# Patient Record
Sex: MALE | Race: WHITE | NOT HISPANIC OR LATINO | URBAN - METROPOLITAN AREA
[De-identification: names, ages, dates, MRNs, and addresses within clinical notes are randomized per-mention and may not be internally consistent; named-entity substitution may affect disease eponyms.]

---

## 2018-05-22 ENCOUNTER — OFFICE VISIT CONVERTED (OUTPATIENT)
Dept: FAMILY MEDICINE CLINIC | Facility: CLINIC | Age: 23
End: 2018-05-22
Attending: NURSE PRACTITIONER

## 2021-05-16 VITALS
DIASTOLIC BLOOD PRESSURE: 78 MMHG | TEMPERATURE: 97.7 F | HEART RATE: 96 BPM | HEIGHT: 70 IN | RESPIRATION RATE: 16 BRPM | BODY MASS INDEX: 34.36 KG/M2 | OXYGEN SATURATION: 99 % | SYSTOLIC BLOOD PRESSURE: 150 MMHG | WEIGHT: 240 LBS

## 2023-08-14 ENCOUNTER — OFFICE VISIT (OUTPATIENT)
Dept: ORTHOPEDIC SURGERY | Facility: CLINIC | Age: 28
End: 2023-08-14
Payer: COMMERCIAL

## 2023-08-14 VITALS
HEIGHT: 70 IN | WEIGHT: 243 LBS | SYSTOLIC BLOOD PRESSURE: 159 MMHG | HEART RATE: 111 BPM | DIASTOLIC BLOOD PRESSURE: 89 MMHG | BODY MASS INDEX: 34.79 KG/M2 | OXYGEN SATURATION: 96 %

## 2023-08-14 DIAGNOSIS — S82.892A CLOSED AVULSION FRACTURE OF LEFT ANKLE, INITIAL ENCOUNTER: Primary | ICD-10-CM

## 2023-08-14 DIAGNOSIS — S93.402A SPRAIN OF LEFT ANKLE, UNSPECIFIED LIGAMENT, INITIAL ENCOUNTER: ICD-10-CM

## 2023-08-14 NOTE — PROGRESS NOTES
"Chief Complaint  Follow-up of the Left Ankle    Subjective          Sergo Dominique presents to [facility]   History of Present Illness    Sergo Dominique presents today for a follow-up of his left ankle.  Patient has a left distal fibula avulsion fracture that we are treating conservatively with initial injury 7/10/2023.  Today, patient states that he is doing okay.  He has been ambulating in his boot without complications.  He reports continued stiffness and pain.  He states that his swelling has improved but continues.  He denies any new injuries.  Denies lower extremity numbness or tingling.      No Known Allergies     Social History     Socioeconomic History    Marital status:    Tobacco Use    Smoking status: Never    Smokeless tobacco: Current     Types: Chew   Vaping Use    Vaping Use: Never used   Substance and Sexual Activity    Alcohol use: Yes    Drug use: Never    Sexual activity: Yes     Partners: Female        I reviewed the patient's chief complaint, history of present illness, review of systems, past medical history, surgical history, family history, social history, medications, and allergy list.     REVIEW OF SYSTEMS    Constitutional: Denies fevers, chills, weight loss  Cardiovascular: Denies chest pain, shortness of breath  Skin: Denies rashes, acute skin changes  Neurologic: Denies headache, loss of consciousness  MSK: Left ankle pain      Objective   Vital Signs:   /89   Pulse 111   Ht 177.8 cm (70\")   Wt 110 kg (243 lb)   SpO2 96%   BMI 34.87 kg/mý     Body mass index is 34.87 kg/mý.    Physical Exam    General: Alert. No acute distress.   Left lower extremity: Tenderness to palpation over the distal fibula.  Nontender to the medial malleolus.  Calf soft, nontender.  Achilles intact.  Ankle stable ligamentous stress.  Demonstrates active ankle plantarflexion and dorsiflexion with associated stiffness.  Mild ankle swelling.  Toe range of " motion intact with associated stiffness.  Sensation intact over dorsal and plantar foot.  Palpable pedal pulses.    Procedures    Imaging Results (Most Recent)       Procedure Component Value Units Date/Time    XR Ankle 3+ View Left [[id number]] Resulted: 08/14/23 1530     Updated: 08/14/23 1534    Narrative:      Indications: Follow-up left ankle fracture    Views: AP, oblique, lateral left ankle    Findings: Left distal fibula avulsion fracture is seen.  Fracture is   stable.  Talus is well reduced beneath the tibia.    Comparative Data: Comparative data found and reviewed today.                   Assessment and Plan    Diagnoses and all orders for this visit:    1. Closed avulsion fracture of left ankle, initial encounter (Primary)  -     Ambulatory Referral to Physical Therapy POST OP, Evaluate and treat, Ortho    2. Sprain of left ankle, unspecified ligament, initial encounter  -     XR Ankle 3+ View Left  -     Ambulatory Referral to Physical Therapy POST OP, Evaluate and treat, Ortho        Sergo Dominique presents today for follow-up of his left ankle avulsion fracture and ankle sprain that we are treating conservatively with initial injury 7/10/2023.  X-rays reviewed with the patient and spouse today.  Formal physical therapy was ordered today.  Patient instructed to continue with his home exercises.  Continue to weight-bear as tolerated wearing the fracture boot until he progresses with formal physical therapy and then he can transition to an ankle brace as tolerated.  Ankle brace brace provided today.  We discussed swelling management with ice and elevation as needed.  Work note written today.      Patient will follow up in 3 weeks for reevaluation.  We will obtain new x-rays of the left ankle at next visit.      Call or return if symptoms worsen or patient has any concerns.       Follow Up   Return in about 3 weeks (around 9/4/2023).  Patient was given instructions and counseling regarding his condition  or for health maintenance advice. Please see specific information pulled into the AVS if appropriate.     Niya Walton PA-C  08/14/23  15:42 EDT

## 2023-08-29 ENCOUNTER — TREATMENT (OUTPATIENT)
Dept: PHYSICAL THERAPY | Facility: CLINIC | Age: 28
End: 2023-08-29
Payer: COMMERCIAL

## 2023-08-29 DIAGNOSIS — R26.9 GAIT DISTURBANCE: ICD-10-CM

## 2023-08-29 DIAGNOSIS — M25.672 ANKLE STIFFNESS, LEFT: ICD-10-CM

## 2023-08-29 DIAGNOSIS — M62.81 MUSCLE WEAKNESS OF LOWER EXTREMITY: ICD-10-CM

## 2023-08-29 DIAGNOSIS — M25.572 ACUTE LEFT ANKLE PAIN: Primary | ICD-10-CM

## 2023-08-29 NOTE — PROGRESS NOTES
Physical Therapy Initial Evaluation and Plan of Care      [facility]: [address] [place] [address]      Patient: Sergo Dominique   : 1995  Diagnosis/ICD-10 Code:  Acute left ankle pain [M25.572]  Referring practitioner: Niya Walton PA-C  Date of Initial Visit: 2023  Today's Date: 2023  Patient seen for 1 sessions           Subjective Questionnaire: LEFS: 37/80      Subjective   Pt reports to physical therapy w/ complaints of L ankle pain secondary to an avulsion fracture. This fracture occurred on 7/10/2023.   Pt reports they are still having some swelling in their L ankle and foot. Pt reports when they are out in public or when they are getting up. Pt reports were given an ankle brace, though this is uncomfortable and they do not like to wear it.   Pt reports their current level of pain is a 2-3/10. Pt reports their pain does increase throughout the day when they are active. Pt reports this pain gets up to a 4-5/10. Pt reports there is always some level of pain present.       Pt occupation: [employer] (pt has not been back to work since their fracture)    Quality of pain: annoying     Past Medical Hx: past high ankle sprain to L ankle in high school.       Xray 2023  Findings: Left distal fibula avulsion fracture is seen. Fracture is stable. Talus is well reduced beneath the tibia.       Objective          Active Range of Motion   Left Ankle/Foot   Plantar flexion: 45 degrees   Inversion: 18 degrees   Eversion: 5 degrees     Right Ankle/Foot   Dorsiflexion (ke): 5 degrees   Dorsiflexion (kf): 7 degrees   Plantar flexion: 55 degrees   Inversion: 35 degrees   Eversion: 8 degrees     Additional Active Range of Motion Details  Pt unable to achieve neutral w/ DF (ke and kf) on L: lacking 10 deg     Strength/Myotome Testing     Left Hip   Planes of Motion   Flexion: 5    Right Hip   Planes of Motion   Flexion: 5    Left Knee   Flexion: 5  Extension: 5    Right Knee    Flexion: 5  Extension: 5    Right Ankle/Foot   Dorsiflexion: 5  Inversion: 5  Eversion: 5    Additional Strength Details  L ankle not tested today due to limited ROM and pain     Swelling   Left Ankle/Foot   Figure 8: 60 cm    Right Ankle/Foot   Figure 8: 57.5 cm    Ambulation     Comments   Pt ambulates into therapy w/ walking boot on their L LE. Discussed w/ pt about bringing their brace to therapy at their next appointment.     See Exercise, Manual, and Modality Logs for complete treatment.       Assessment & Plan       Assessment  Impairments: abnormal gait, abnormal muscle firing, abnormal or restricted ROM, activity intolerance, impaired balance, impaired physical strength, lacks appropriate home exercise program and pain with function   Functional limitations: carrying objects, lifting, sleeping, walking, pushing, uncomfortable because of pain, moving in bed, sitting, standing, stooping and unable to perform repetitive tasks   Assessment details: Pt reports to physical therapy s/p avulsion fracture of their L ankle 7/10/2023. Pt is currently WBAT in boot and is to progress to wearing an ankle brace. Pt presents w/ decreased ROM, increased pain, and weakness. Pt has gait abnormalities due to pain and wearing a walking boot on their L foot. Pt has increased perceived deficits described by LEFS. Educated pt on their HEP as well as the importance of utilizing ice for inflammation and swelling. Pt will benefit from skilled physical therapy, to address their current impairments and limitations, in order to improve upon their functional mobility and gait to return to full duty at work safely and without restrictions.       Prognosis: good    Goals  Plan Goals: ANKLE/FOOT PROBLEMS:     1. The patient has limited ROM of the L ankle.   LTG 1: 12 weeks:  The patient will demonstrate 15 degrees of DF with knee extended for the L ankle in order to allow patient to ambulate w/ improved gait mechanics and decreased  pain.    STATUS:  New   STG 1a: 6 weeks:  The patient will demonstrate 8 degrees of DF with knee extended for the L ankle.    STATUS:  New   TREATMENT: Manual therapy, therapeutic exercise, home exercise instruction, and modalities as needed to include:  moist heat, electrical stimulation, and ice.    2. The patient has limited strength of the L ankle.   LTG 2: 12 weeks: The patient will demonstrate 5/5 strength for L ankle DF and Eversion to  Increased the overall joint stability of the ankle and foot.     STATUS:  New   STG 2a: 6 weeks: The patient will demonstrate 4/5 strength for L ankle DF and eversion without pain in arch or foot.    STATUS:  New    TREATMENT: Manual therapy, therapeutic exercise, home exercise instruction, aquatic therapy, and modalities as needed to include:  moist heat, electrical stimulation, ultrasound, and ice.     3. The patient has gait dysfunction.   LTG 3: 12 weeks:  The patient will ambulate independently for community distances with minimal limp to the L lower extremity in order to improve mobility and allow patient to perform activities such as ADLs and work tasks with greater ease.    STATUS:  New   STG 3a: 6 weeks: Patient will ambulate while wearing their ankle brace for household distances with minimal limp to their L LE.    STATUS:  New   TREATMENT: Gait training, aquatic therapy, therapeutic exercise, and home exercise instruction.    4. Mobility: Walking/Moving Around Functional Limitation     LTG 4: 12 weeks:  The patient will demonstrate 12.5% limitation by achieving a score of 70/80 on the LEFS.    STATUS:  New   STG 4 a: 6 weeks:  The patient will demonstrate 25% limitation by achieving a score of 60/80 on the LEFS.      STATUS:  New   TREATMENT:  Manual therapy, therapeutic exercise, home exercise instruction, and modalities as needed to include: moist heat, electrical stimulation, and ultrasound.           PLAN:  Therapy options: will receive skilled therapy  services  Planned modality interventions: Cryotherapy  Planned therapy interventions:balance/weight-bearing training, ADL retraining, soft tissue mobilization, strengthening, stretching, therapeutic activities, manual therapy, joint mobilization, home exercise program/patient education, gait training, functional ROM exercises, flexibility, body mechanics training, postural training, and neuromuscular re-education  Frequency: 2x per week  Duration in weeks: 12  Treatment plan discussed with: patient      Visit Diagnoses:    ICD-10-CM ICD-9-CM   1. Acute left ankle pain  M25.572 719.47   2. Gait disturbance  R26.9 781.2   3. Ankle stiffness, left  M25.672 719.57   4. Muscle weakness of lower extremity  M62.81 728.87       History # of Personal Factors and/or Comorbidities: LOW (0)  Examination of Body System(s): # of elements: LOW (1-2)  Clinical Presentation: STABLE   Clinical Decision Making: LOW       Timed:         Manual Therapy:    0     mins  89871;     Therapeutic Exercise:    25     mins  46306;     Neuromuscular Anish:    0    mins  53333;    Therapeutic Activity:     0     mins  30551;     Gait Trainin     mins  74017;     Ultrasound:     0     mins  87372;    Ionto                               0    mins   71030  Self Care                       0     mins   71371  Canalith Repos    0     mins 02016      Un-Timed:  Electrical Stimulation:    0     mins  08857 ( );  Dry Needling     0     mins self-pay  Traction     0     mins 43539  Low Eval     20     Mins  28815  Mod Eval     0     Mins  61531  High Eval                       0     Mins  12745  Re-Eval                           0    mins  19267    Timed Treatment:   25   mins   Total Treatment:     45   mins    PT SIGNATURE: Emeterio De La O PT, DPT    Electronically signed 2023    [place] License: PT - [id number]    Initial Certification  Certification Period: 2023 thru 2023  I certify that the therapy services are furnished while this  patient is under my care.  The services outlined above are required by this patient, and will be reviewed every 90 days.     PHYSICIAN: Niya Walton PA-C  NPI: [id number]      DATE:     Please sign and return via fax to [phone]. Thank you, [facility].

## 2023-08-31 ENCOUNTER — TREATMENT (OUTPATIENT)
Dept: PHYSICAL THERAPY | Facility: CLINIC | Age: 28
End: 2023-08-31
Payer: COMMERCIAL

## 2023-08-31 DIAGNOSIS — M25.672 ANKLE STIFFNESS, LEFT: ICD-10-CM

## 2023-08-31 DIAGNOSIS — M62.81 MUSCLE WEAKNESS OF LOWER EXTREMITY: ICD-10-CM

## 2023-08-31 DIAGNOSIS — R26.9 GAIT DISTURBANCE: ICD-10-CM

## 2023-08-31 DIAGNOSIS — M25.572 ACUTE LEFT ANKLE PAIN: Primary | ICD-10-CM

## 2023-08-31 NOTE — PROGRESS NOTES
Physical Therapy Daily Treatment Note  [facility]: [address] [place] [address]      Patient: Sergo Dominique   : 1995  Diagnosis/ICD-10 Code:  Acute left ankle pain [M25.572]  Referring practitioner: Niya Walton PA-C  Date of Initial Visit: Type: THERAPY  Noted: 2023  Today's Date: 2023  Patient seen for 2 sessions           Subjective   The patient reported they have been performing their HEP every day at home.     Objective   See Exercise, Manual, and Modality Logs for complete treatment.     Assessment/Plan  Pt does continue to have stiffness in their foot and have the tendency to move their entire L LE as a unit. Encouraged pt to continue their HEP for improvement in ROM. Encouraged pt to ambulate w/ proper gait mechanics. Patient will continue to benefit from skilled physical therapy to further address their deficits in strength and ROM in order to improve upon their functional mobility and to meet their personal goals.          Timed:  Manual Therapy:    0     mins  29040;  Therapeutic Exercise:    23     mins  62525;     Neuromuscular Anish:   8    mins  45816;    Therapeutic Activity:     0     mins  24796;     Gait Trainin     mins  50818;     Aquatics                         0      mins  54459    Un-timed:  Mechanical Traction      0     mins  73602  Electrical Stimulation:    0     mins  91782 ( );      Timed Treatment:   31   mins   Total Treatment:     31   mins    Emeterio De La O PT, DPT    Electronically signed 2023    [place] License: PT - [id number]

## 2023-09-06 ENCOUNTER — OFFICE VISIT (OUTPATIENT)
Dept: ORTHOPEDIC SURGERY | Facility: CLINIC | Age: 28
End: 2023-09-06
Payer: COMMERCIAL

## 2023-09-06 ENCOUNTER — TREATMENT (OUTPATIENT)
Dept: PHYSICAL THERAPY | Facility: CLINIC | Age: 28
End: 2023-09-06
Payer: COMMERCIAL

## 2023-09-06 VITALS — BODY MASS INDEX: 34.79 KG/M2 | HEIGHT: 70 IN | WEIGHT: 243 LBS

## 2023-09-06 DIAGNOSIS — R26.9 GAIT DISTURBANCE: ICD-10-CM

## 2023-09-06 DIAGNOSIS — S93.402A SPRAIN OF LEFT ANKLE, UNSPECIFIED LIGAMENT, INITIAL ENCOUNTER: ICD-10-CM

## 2023-09-06 DIAGNOSIS — M25.572 ACUTE LEFT ANKLE PAIN: Primary | ICD-10-CM

## 2023-09-06 DIAGNOSIS — M25.572 LEFT ANKLE PAIN, UNSPECIFIED CHRONICITY: ICD-10-CM

## 2023-09-06 DIAGNOSIS — M62.81 MUSCLE WEAKNESS OF LOWER EXTREMITY: ICD-10-CM

## 2023-09-06 DIAGNOSIS — M25.672 ANKLE STIFFNESS, LEFT: ICD-10-CM

## 2023-09-06 DIAGNOSIS — S82.892A CLOSED AVULSION FRACTURE OF LEFT ANKLE, INITIAL ENCOUNTER: Primary | ICD-10-CM

## 2023-09-06 PROCEDURE — 97530 THERAPEUTIC ACTIVITIES: CPT | Performed by: PHYSICAL THERAPIST

## 2023-09-06 PROCEDURE — 97110 THERAPEUTIC EXERCISES: CPT | Performed by: PHYSICAL THERAPIST

## 2023-09-06 NOTE — PROGRESS NOTES
Physical Therapy Daily Treatment Note      Patient: Sergo Dominique   : 1995  Referring practitioner: Niya Walton PA-C  Date of Initial Visit: Type: THERAPY  Noted: 2023  Today's Date: 2023  Patient seen for 3 sessions           Subjective Questionnaire:       Subjective Evaluation    History of Present Illness    Subjective comment: Pt reported his L ankle feels better than it has been but he hasn't done much today.     Objective   See Exercise, Manual, and Modality Logs for complete treatment.       Assessment & Plan       Assessment  Assessment details: Pt presents wearing an ankle brace on L and exhibits edema in L calf.  Pt performed ankle activity slowly and with difficulty.  Pt will benefit from continued PT      Visit Diagnoses:    ICD-10-CM ICD-9-CM   1. Acute left ankle pain  M25.572 719.47   2. Gait disturbance  R26.9 781.2   3. Ankle stiffness, left  M25.672 719.57   4. Muscle weakness of lower extremity  M62.81 728.87       Progress per Plan of Care and Progress strengthening /stabilization /functional activity           Timed:  Manual Therapy:    5     mins  39635;  Therapeutic Exercise:    15     mins  19517;     Neuromuscular Anish:        mins  97678;    Therapeutic Activity:     8     mins  99251;     Gait Training:           mins  86476;     Ultrasound:          mins  91098;    Electrical Stimulation:         mins  92084 ( );  Aquatic Therapy          mins  90788    Untimed:  Electrical Stimulation:         mins  53440 ( );  Mechanical Traction:         mins  98911;     Timed Treatment:   28   mins   Total Treatment:     28   mins    Electronically signed    Magdalena Kim PTA  Physical Therapist Assistant    [facility] license: [id number]

## 2023-09-06 NOTE — PROGRESS NOTES
"Chief Complaint  Follow-up of the Left Ankle    Subjective          Sergo Dominique presents to [facility]   History of Present Illness    Sergo Dominique presents today for a follow-up of his left ankle.  Patient has a left distal fibula avulsion fracture that we have been treating conservatively with initial injury 7/10/2023.  Today, patient states he is doing okay.  He has been working physical therapy report soreness after physical therapy.  He does continue to experience tightness and stiffness with range of motion although he does explain that and is gradually improving.  He states his swelling has been improving.  He reports pain fluctuations, specifically with activities.  He has been wearing his ankle brace for about 1 week.  He denies new injuries.      No Known Allergies     Social History     Socioeconomic History    Marital status:    Tobacco Use    Smoking status: Never    Smokeless tobacco: Current     Types: Chew   Vaping Use    Vaping Use: Never used   Substance and Sexual Activity    Alcohol use: Yes    Drug use: Never    Sexual activity: Yes     Partners: Female        I reviewed the patient's chief complaint, history of present illness, review of systems, past medical history, surgical history, family history, social history, medications, and allergy list.     REVIEW OF SYSTEMS    Constitutional: Denies fevers, chills, weight loss  Cardiovascular: Denies chest pain, shortness of breath  Skin: Denies rashes, acute skin changes  Neurologic: Denies headache, loss of consciousness  MSK: Left ankle pain      Objective   Vital Signs:   Ht 177.8 cm (70\")   Wt 110 kg (243 lb)   BMI 34.87 kg/m²     Body mass index is 34.87 kg/m².    Physical Exam    General: Alert. No acute distress.   Left lower extremity: Tenderness to palpation over the left lateral malleolus.  Nontender to medial malleolus.  Calf soft, nontender.  Achilles intact.  Ankle stable ligamentous " stress.  Stiffness with ankle plantarflexion and dorsiflexion.  5 degrees shy of neutral for dorsiflexion.  20 degrees of plantarflexion.  Mild swelling to the ankle.  Toe range of motion intact.  Sensation intact over dorsal plantar foot.  Palpable pedal pulses.    Procedures    Imaging Results (Most Recent)       Procedure Component Value Units Date/Time    XR Ankle 3+ View Left [[id number]] Resulted: 09/06/23 1611     Updated: 09/06/23 1612    Narrative:      Indications: Follow-up left ankle fracture    Views: AP, oblique, lateral left ankle    Findings: Left distal fibula avulsion fracture is seen and stable.  No   additional fractures.  Talus is well reduced beneath the tibia.    Comparative Data: Comparative data found and reviewed today.                   Assessment and Plan    Diagnoses and all orders for this visit:    1. Closed avulsion fracture of left ankle, initial encounter (Primary)    2. Sprain of left ankle, unspecified ligament, initial encounter  -     XR Ankle 3+ View Left    3. Left ankle pain, unspecified chronicity        Sergo Dominique presents today for follow-up of his left distal fibula avulsion fracture that we have been treating conservatively with initial injury 7/10/2023.  X-rays reviewed with the patient today.  Patient instructed to continue with physical therapy and his home exercises.  We discussed importance of improving ankle range of motion.  Patient expressed understanding and agreement.  Continue with ice and elevation as needed for inflammation.  Continue with the ankle brace for another 3 to 4 weeks and then gradually wean out of brace as tolerated.  Work note written today.      Patient will follow up in 4 weeks for reevaluation.  We will obtain new x-rays of the left ankle at next visit.      Call or return if symptoms worsen or patient has any concerns.       Follow Up   Return in about 4 weeks (around 10/4/2023).  Patient was given instructions and counseling  regarding his condition or for health maintenance advice. Please see specific information pulled into the AVS if appropriate.     Niya Walton PA-C  09/06/23  16:42 EDT

## 2023-09-12 ENCOUNTER — TREATMENT (OUTPATIENT)
Dept: PHYSICAL THERAPY | Facility: CLINIC | Age: 28
End: 2023-09-12
Payer: COMMERCIAL

## 2023-09-12 DIAGNOSIS — M25.572 ACUTE LEFT ANKLE PAIN: Primary | ICD-10-CM

## 2023-09-12 DIAGNOSIS — M62.81 MUSCLE WEAKNESS OF LOWER EXTREMITY: ICD-10-CM

## 2023-09-12 DIAGNOSIS — M25.672 ANKLE STIFFNESS, LEFT: ICD-10-CM

## 2023-09-12 DIAGNOSIS — R26.9 GAIT DISTURBANCE: ICD-10-CM

## 2023-09-12 NOTE — PROGRESS NOTES
Physical Therapy Daily Treatment Note  [facility] [address] [facility] [place] [address]      Patient: Sergo Dominique   : 1995  Diagnosis/ICD-10 Code:  Acute left ankle pain [M25.572]  Referring practitioner: Niya Walton PA-C  Date of Initial Visit: Type: THERAPY  Noted: 2023  Today's Date: 2023  Patient seen for 4 sessions           Subjective   The patient reported they did have more mobility in their L ankle after the manual therapy. Pt reports they do have increased tightness in their post calf. Pt reports they just have some soreness unless they are stretching that area.     Objective   Pt ambulates w/ L foot in a more neutral position.    See Exercise, Manual, and Modality Logs for complete treatment.     Assessment/Plan  Pt does have improvement in their gait as well as their ROM. Patient will continue to benefit from skilled physical therapy to further address their deficits in strength and ROM in order to improve upon their functional mobility and to meet their personal goals.          Timed:  Manual Therapy:    8     mins  31523;  Therapeutic Exercise:    15     mins  93592;     Neuromuscular Anish:   0    mins  19167;    Therapeutic Activity:     8     mins  16454;     Gait Trainin     mins  06266;     Aquatics                         0      mins  50600    Un-timed:  Mechanical Traction      0     mins  35290  Electrical Stimulation:    0     mins  17454 ( );      Timed Treatment:   31   mins   Total Treatment:     31   mins    Emeterio De La O PT, DPT    Electronically signed 2023    [place] License: PT - [id number]

## 2023-09-14 ENCOUNTER — TREATMENT (OUTPATIENT)
Dept: PHYSICAL THERAPY | Facility: CLINIC | Age: 28
End: 2023-09-14
Payer: COMMERCIAL

## 2023-09-14 DIAGNOSIS — R26.9 GAIT DISTURBANCE: ICD-10-CM

## 2023-09-14 DIAGNOSIS — M25.672 ANKLE STIFFNESS, LEFT: ICD-10-CM

## 2023-09-14 DIAGNOSIS — M62.81 MUSCLE WEAKNESS OF LOWER EXTREMITY: ICD-10-CM

## 2023-09-14 DIAGNOSIS — M25.572 ACUTE LEFT ANKLE PAIN: Primary | ICD-10-CM

## 2023-09-14 NOTE — PROGRESS NOTES
Outpatient Physical Therapy                   Physical Therapy Daily Treatment Note    Patient: Sergo Dominique   : 1995  Diagnosis/ICD-10 Code:  Acute left ankle pain [M25.572]  Referring practitioner: Niya Walton PA-C  Date of Initial Visit: Type: THERAPY  Noted: 2023  Today's Date: 2023  Patient seen for 5 sessions             Subjective   Sergo Dominique reports: soreness in his left achilles due to stretches preformed the last two sessions.     Pain: 0/10 pain, at time of arrival located in his left ankle.     Objective     See Exercise, Manual, and Modality Logs for complete treatment.     Assessment/Plan  Sergo progressing as evident by decreased overall ankle pain. Pt tolerated exercises and manual well, no complaints of increased pain or discomfort. Pt would benefit from skilled PT to address Range of Motion  and Strength deficits, pain management and any concerns with ADLs.       Progress per Plan of Care      Timed:  Manual Therapy:    9     mins  10469;  Therapeutic Exercise:    13     mins  03486;     Neuromuscular Anish:    0    mins  00338;    Therapeutic Activity:     8     mins  77481;     Gait Trainin     mins  09828;    Aquatic Therapy:     0     mins  46493;       Untimed:  Electrical Stimulation:    0     mins  72801 ( );  Mechanical Traction:    0     mins  44549;       Timed Treatment:   30   mins   Total Treatment:     30   mins      Electronically signed:   Melia Mejía PTA  Physical Therapist Assistant  [facility] License #: [id number]

## 2023-09-19 ENCOUNTER — TREATMENT (OUTPATIENT)
Dept: PHYSICAL THERAPY | Facility: CLINIC | Age: 28
End: 2023-09-19
Payer: COMMERCIAL

## 2023-09-19 DIAGNOSIS — M25.672 ANKLE STIFFNESS, LEFT: ICD-10-CM

## 2023-09-19 DIAGNOSIS — R26.9 GAIT DISTURBANCE: ICD-10-CM

## 2023-09-19 DIAGNOSIS — M25.572 ACUTE LEFT ANKLE PAIN: Primary | ICD-10-CM

## 2023-09-19 DIAGNOSIS — M62.81 MUSCLE WEAKNESS OF LOWER EXTREMITY: ICD-10-CM

## 2023-09-19 NOTE — PROGRESS NOTES
"Outpatient Physical Therapy                   Physical Therapy Daily Treatment Note    Patient: Sergo Dominique   : 1995  Diagnosis/ICD-10 Code:  Acute left ankle pain [M25.572]  Referring practitioner: Niya Walton PA-C  Date of Initial Visit: Type: THERAPY  Noted: 2023  Today's Date: 2023  Patient seen for 6 sessions             Subjective   Sergo Dominique reports: while preforming heel raises he feels like this lateral ankle needs to pop when in plantar flexion.     Pain: 0/10 pain, at time of arrival. \"Just tightness.\"     Objective     See Exercise, Manual, and Modality Logs for complete treatment.     Assessment/Plan  Sergo progressing as evident by decreased overall ankle pain. Pt tolerated exercises and manual well,  just complaints of soreness . Patient did have a pop in his ankle with manual therapy which patient states helped with the sensation with plantar flexion. Pt would benefit from skilled PT to address Range of Motion  and Strength deficits, pain management and any concerns with ADLs.       Progress per Plan of Care      Timed:  Manual Therapy:    8     mins  22154;  Therapeutic Exercise:    10     mins  98834;     Neuromuscular Anish:    0    mins  50557;    Therapeutic Activity:     10     mins  73690;     Gait Trainin     mins  01307;    Aquatic Therapy:     0     mins  78501;       Untimed:  Electrical Stimulation:    0     mins  12314 ( );  Mechanical Traction:    0     mins  17158;       Timed Treatment:   28   mins   Total Treatment:     28   mins      Electronically signed:   Melia Mejía PTA  Physical Therapist Assistant  [facility]ospit[facility] License #: [id number]  " Addended by: CALEB NEFF on: 8/2/2017 07:57 PM     Modules accepted: Orders

## 2023-09-21 ENCOUNTER — TREATMENT (OUTPATIENT)
Dept: PHYSICAL THERAPY | Facility: CLINIC | Age: 28
End: 2023-09-21
Payer: COMMERCIAL

## 2023-09-21 DIAGNOSIS — M25.672 ANKLE STIFFNESS, LEFT: ICD-10-CM

## 2023-09-21 DIAGNOSIS — R26.9 GAIT DISTURBANCE: ICD-10-CM

## 2023-09-21 DIAGNOSIS — M62.81 MUSCLE WEAKNESS OF LOWER EXTREMITY: ICD-10-CM

## 2023-09-21 DIAGNOSIS — M25.572 ACUTE LEFT ANKLE PAIN: Primary | ICD-10-CM

## 2023-09-21 NOTE — PROGRESS NOTES
"Outpatient Physical Therapy                   Physical Therapy Daily Treatment Note    Patient: Sergo Dominique   : 1995  Diagnosis/ICD-10 Code:  Acute left ankle pain [M25.572]  Referring practitioner: Niya Walton PA-C  Date of Initial Visit: Type: THERAPY  Noted: 2023  Today's Date: 2023  Patient seen for 7 sessions             Subjective   Sergo Dominique reports: 0/10 pain, \"just soreness\" located in his left achilles at time of arrival.     Objective     See Exercise, Manual, and Modality Logs for complete treatment.     Assessment/Plan  Patient experienced an audible pop in his lateral ankle when he preformed the first heel raise. Patient states the pop felt good and his ankle \"feels brand new now, I've been trying to do that all morning.\" Pt would benefit from skilled PT to address Range of Motion  and Strength deficits, pain management and any concerns with ADLs.       Progress per Plan of Care      Timed:  Manual Therapy:    8     mins  89896;  Therapeutic Exercise:    10     mins  14964;     Neuromuscular Anish:    0    mins  60659;    Therapeutic Activity:     8     mins  17294;     Gait Trainin     mins  68038;    Aquatic Therapy:     0     mins  00364;       Untimed:  Electrical Stimulation:    0     mins  00481 ( );  Mechanical Traction:    0     mins  94475;       Timed Treatment:   26   mins   Total Treatment:     26   mins      Electronically signed:   Melia Mejía PTA  Physical Therapist Assistant  [facility] License #: [id number]  "

## 2023-09-25 ENCOUNTER — TREATMENT (OUTPATIENT)
Dept: PHYSICAL THERAPY | Facility: CLINIC | Age: 28
End: 2023-09-25

## 2023-09-25 DIAGNOSIS — R26.9 GAIT DISTURBANCE: ICD-10-CM

## 2023-09-25 DIAGNOSIS — M62.81 MUSCLE WEAKNESS OF LOWER EXTREMITY: ICD-10-CM

## 2023-09-25 DIAGNOSIS — M25.572 ACUTE LEFT ANKLE PAIN: Primary | ICD-10-CM

## 2023-09-25 DIAGNOSIS — M25.672 ANKLE STIFFNESS, LEFT: ICD-10-CM

## 2023-09-25 NOTE — PROGRESS NOTES
Physical Therapy Daily Treatment Note  [facility] [address] [facility] [place] [address]      Patient: Sergo Dominique   : 1995  Diagnosis/ICD-10 Code:  Acute left ankle pain [M25.572]  Referring practitioner: Niya Walton PA-C  Date of Initial Visit: Type: THERAPY  Noted: 2023  Today's Date: 2023  Patient seen for 8 sessions           Subjective   The patient reported they went to the [employer] and has some soreness in their heel. Pt reports their pain is a 4/10. Pt reports this is where most of their pain is located.     Objective   See Exercise, Manual, and Modality Logs for complete treatment.     Assessment/Plan  Pt tolerates therapy session well without increased complaints of pain. Patient will continue to benefit from skilled physical therapy to further address their deficits in strength and ROM in order to improve upon their functional mobility and to meet their personal goals.          Timed:  Manual Therapy:    10     mins  93313;  Therapeutic Exercise:    15     mins  93819;     Neuromuscular Anish:   0    mins  31204;    Therapeutic Activity:     8     mins  92191;     Gait Trainin     mins  00429;     Aquatics                         0      mins  68886    Un-timed:  Mechanical Traction      0     mins  32369  Electrical Stimulation:    0     mins  97096 ( );      Timed Treatment:   33   mins   Total Treatment:     33   mins    Emeterio De La O PT, DPT    Electronically signed 2023    [place] License: PT - [id number]

## 2023-09-27 ENCOUNTER — TREATMENT (OUTPATIENT)
Dept: PHYSICAL THERAPY | Facility: CLINIC | Age: 28
End: 2023-09-27
Payer: COMMERCIAL

## 2023-09-27 DIAGNOSIS — R26.9 GAIT DISTURBANCE: ICD-10-CM

## 2023-09-27 DIAGNOSIS — M62.81 MUSCLE WEAKNESS OF LOWER EXTREMITY: ICD-10-CM

## 2023-09-27 DIAGNOSIS — M25.572 ACUTE LEFT ANKLE PAIN: Primary | ICD-10-CM

## 2023-09-27 DIAGNOSIS — M25.672 ANKLE STIFFNESS, LEFT: ICD-10-CM

## 2023-09-27 NOTE — PROGRESS NOTES
Progress Note  Cathie PT: [address] [place] [address]      Patient: Sergo Dominique   : 1995  Diagnosis/ICD-10 Code:  Acute left ankle pain [M25.572]  Referring practitioner: Niya Walton PA-C  Date of Initial Visit: Type: THERAPY  Noted: 2023  Today's Date: 2023  Patient seen for 9 sessions      Subjective:   Subjective Questionnaire: LEFS: 43/80  Clinical Progress: improved  Home Program Compliance: Yes  Treatment has included: balance/weight-bearing training, ADL retraining, soft tissue mobilization, strengthening, stretching, therapeutic activities, manual therapy, joint mobilization, home exercise program/patient education, gait training, functional ROM exercises, flexibility, body mechanics training, postural training, and neuromuscular re-education    Subjective   Pt reports their L ankle pain is less than last time. Pt reports this pain is a 2/10 and is still located at their luisito's tendon. Pt reports they are still struggling some w/ staying on their feet for a longer periods of time. Pt reports when they do have pain on the dorsal aspect of their foot (proximal to joint), it just feels as though it wants to pop. Pt states that if it popped, it would probably feel better.       Objective     Pt continues to wear ankle brace at this time. Pt has a tendency to whip their ankle, as they do not have available DF.     Active Range of Motion   Left Ankle/Foot   DF(ke): neutral  DF(kf): 1 degree   Plantar flexion: 45 degrees   Inversion: 30 degrees   Eversion: 5 degrees        Strength/Myotome Testing        Left Ankle/Foot   Dorsiflexion: 4-  Inversion: 4   Eversion: 4-      See Exercise, Manual, and Modality Logs for complete treatment.     Assessment/Plan  mpairments: abnormal gait, abnormal muscle firing, abnormal or restricted ROM, activity intolerance, impaired balance, impaired physical strength, and pain with function   Functional limitations: carrying  objects, lifting, walking, pushing, standing, stooping and unable to perform repetitive tasks     Pt reported to physical therapy s/p avulsion fracture of their L ankle 7/10/2023. Pt demonstrates improvement in their ROM as well as their strength. Pt does continue to have very limited DF, which is causing them to compensate in their gait pattern. Pt has improvement in their perceived deficits described by LEFS. Pt was provided theraband today to further facilitate their strength, as well as their functional ROM use. Discussed w/ pt about continuing to advance the time they are active and standing, to improve upon their tolerance for return to work. Pt's goals will be addressed at this time to reflect their current progress in therapy. Patient will continue to benefit from skilled physical therapy to further address their deficits in strength and ROM in order to improve upon their functional mobility and to meet their personal goals.           Goals  Plan Goals: ANKLE/FOOT PROBLEMS:      1. The patient has limited ROM of the L ankle.              LTG 1: 12 weeks:  The patient will demonstrate 15 degrees of DF with knee extended for the L ankle in order to allow patient to ambulate w/ improved gait mechanics and decreased pain.                          STATUS:  progressing               STG 1a: 6 weeks:  The patient will demonstrate 8 degrees of DF with knee extended for the L ankle.                          STATUS:  progressing               TREATMENT: Manual therapy, therapeutic exercise, home exercise instruction, and modalities as needed to include:  moist heat, electrical stimulation, and ice.     2. The patient has limited strength of the L ankle.              LTG 2: 12 weeks: The patient will demonstrate 5/5 strength for L ankle DF and Eversion to  Increased the overall joint stability of the ankle and foot.                           STATUS:  progressing               STG 2a: 6 weeks: The patient will  demonstrate 4/5 strength for L ankle DF and eversion without pain in arch or foot.                          STATUS:  progressing               TREATMENT: Manual therapy, therapeutic exercise, home exercise instruction, aquatic therapy, and modalities as needed to include:  moist heat, electrical stimulation, ultrasound, and ice.                3. The patient has gait dysfunction.              LTG 3: 12 weeks:  The patient will ambulate independently for community distances with minimal limp to the L lower extremity in order to improve mobility and allow patient to perform activities such as ADLs and work tasks with greater ease.                          STATUS:  progressing               STG 3a: 6 weeks: Patient will ambulate while wearing their ankle brace for household distances with minimal limp to their L LE.                          STATUS:  met              TREATMENT: Gait training, aquatic therapy, therapeutic exercise, and home exercise instruction.     4. Mobility: Walking/Moving Around Functional Limitation                               LTG 4: 12 weeks:  The patient will demonstrate 12.5% limitation by achieving a score of 70/80 on the LEFS.                          STATUS:  progressing               STG 4 a: 6 weeks:  The patient will demonstrate 25% limitation by achieving a score of 60/80 on the LEFS.                            STATUS:  progressing               TREATMENT:  Manual therapy, therapeutic exercise, home exercise instruction, and modalities as needed to include: moist heat, electrical stimulation, and ultrasound.    Progress toward previous goals: Partially Met      Recommendations: Continue as planned  Timeframe: 2 a week, for 3 months   Prognosis to achieve goals: good    PT Signature: Emeterio De La O PT, DPT    Electronically signed 9/27/2023    [place] License: PT - [id number]       Timed:  Manual Therapy:    0     mins  03767;  Therapeutic Exercise:    25     mins  17221;     Neuromuscular Anish:     0    mins  92649;    Therapeutic Activity:     8     mins  01642;     Gait Trainin     mins  42694;     Aquatics                         0      mins  41019    Un-timed:  Mechanical Traction      0     mins  75083  Dry Needling     0     mins self-pay  Electrical Stimulation:    0     mins  56916 ( );    Timed Treatment:   33   mins   Total Treatment:     33   mins

## 2023-10-02 ENCOUNTER — TREATMENT (OUTPATIENT)
Dept: PHYSICAL THERAPY | Facility: CLINIC | Age: 28
End: 2023-10-02
Payer: COMMERCIAL

## 2023-10-02 DIAGNOSIS — M25.672 ANKLE STIFFNESS, LEFT: ICD-10-CM

## 2023-10-02 DIAGNOSIS — M62.81 MUSCLE WEAKNESS OF LOWER EXTREMITY: ICD-10-CM

## 2023-10-02 DIAGNOSIS — M25.572 ACUTE LEFT ANKLE PAIN: Primary | ICD-10-CM

## 2023-10-02 DIAGNOSIS — R26.9 GAIT DISTURBANCE: ICD-10-CM

## 2023-10-02 PROCEDURE — 97140 MANUAL THERAPY 1/> REGIONS: CPT

## 2023-10-02 PROCEDURE — 97110 THERAPEUTIC EXERCISES: CPT

## 2023-10-02 PROCEDURE — 97530 THERAPEUTIC ACTIVITIES: CPT

## 2023-10-02 NOTE — PROGRESS NOTES
Physical Therapy Daily Treatment Note  Cathie PT: [address] [place] [address]      Patient: Sergo Dominique   : 1995  Diagnosis/ICD-10 Code:  Acute left ankle pain [M25.572]  Referring practitioner: Niya Walton PA-C  Date of Initial Visit: Type: THERAPY  Noted: 2023  Today's Date: 10/2/2023  Patient seen for 10 sessions           Subjective   The patient reported they have some increased stiffness in their L ankle due to a long car ride this past weekend. Pt rates their current L ankle pain as a 4/10. This pain is not as specific today, as it is 'everywhere' in their ankle.     Objective   See Exercise, Manual, and Modality Logs for complete treatment.     Assessment/Plan  Discussed w/ pt about icing for increased pain and inflammation. Patient will continue to benefit from skilled physical therapy to further address their deficits in strength and ROM in order to improve upon their functional mobility and to meet their personal goals.          Timed:  Manual Therapy:    8     mins  06552;  Therapeutic Exercise:    15     mins  91864;     Neuromuscular Anish:   0    mins  11163;    Therapeutic Activity:     8     mins  30313;     Gait Trainin     mins  25476;     Aquatics                         0      mins  20218    Un-timed:  Mechanical Traction      0     mins  27748  Electrical Stimulation:    0     mins  65149 ( );      Timed Treatment:   31   mins   Total Treatment:     31   mins    Emeterio De La O PT, DPT    Electronically signed 10/2/2023    [place] License: PT - [id number]

## 2023-10-04 ENCOUNTER — OFFICE VISIT (OUTPATIENT)
Dept: ORTHOPEDIC SURGERY | Facility: CLINIC | Age: 28
End: 2023-10-04
Payer: COMMERCIAL

## 2023-10-04 ENCOUNTER — TREATMENT (OUTPATIENT)
Dept: PHYSICAL THERAPY | Facility: CLINIC | Age: 28
End: 2023-10-04
Payer: COMMERCIAL

## 2023-10-04 VITALS
WEIGHT: 243 LBS | DIASTOLIC BLOOD PRESSURE: 85 MMHG | HEART RATE: 84 BPM | HEIGHT: 70 IN | OXYGEN SATURATION: 96 % | BODY MASS INDEX: 34.79 KG/M2 | SYSTOLIC BLOOD PRESSURE: 136 MMHG

## 2023-10-04 DIAGNOSIS — S93.402A SPRAIN OF LEFT ANKLE, UNSPECIFIED LIGAMENT, INITIAL ENCOUNTER: ICD-10-CM

## 2023-10-04 DIAGNOSIS — M25.572 ACUTE LEFT ANKLE PAIN: Primary | ICD-10-CM

## 2023-10-04 DIAGNOSIS — M62.81 MUSCLE WEAKNESS OF LOWER EXTREMITY: ICD-10-CM

## 2023-10-04 DIAGNOSIS — R26.9 GAIT DISTURBANCE: ICD-10-CM

## 2023-10-04 DIAGNOSIS — M25.672 ANKLE STIFFNESS, LEFT: ICD-10-CM

## 2023-10-04 DIAGNOSIS — S82.892A CLOSED AVULSION FRACTURE OF LEFT ANKLE, INITIAL ENCOUNTER: Primary | ICD-10-CM

## 2023-10-04 NOTE — PROGRESS NOTES
"Chief Complaint  Follow-up of the Left Ankle    Subjective      Sergo Dominique presents to [facility] for follow up of his left ankle pain.  He presents with a left distal fibula avulstion fracture that he sustained on 7/10/2023. We have been treating this conservatively with physical therapy, home exercises, and an ankle brace. He is still in therapy and has advanced to using the bands. He is having some improvement at therapy. He is still having some soreness and stiffness. He recently had to travel to [place] and had some stiffness after the long car ride. He has some swelling. Denies any new injuries. He is wearing his ankle brace all the time.     No Known Allergies    Objective     Vital Signs:   Vitals:    10/04/23 1341   BP: 136/85   Pulse: 84   SpO2: 96%   Weight: 110 kg (243 lb)   Height: 177.8 cm (70\")   PainSc:   3     Body mass index is 34.87 kg/m².    I reviewed the patient's chief complaint, history of present illness, review of systems, past medical history, surgical history, family history, social history, medications, and allergy list.     REVIEW OF SYSTEMS    Constitutional: Denies fevers, chills, weight loss  Cardiovascular: Denies chest pain, shortness of breath  Skin: Denies rashes, acute skin changes  Neurologic: Denies headache, loss of consciousness  MSK: Left ankle pain.     Ortho Exam  Ankle General: Alert. No acute distress.   Left lower extremity: Minimal swelling. Toe range of motion intact with associated stiffness.  Calf soft, nontender.  Non-tender over medial and lateral malleolus.  Achilles intact.  Sensation intact over the dorsal and plantar foot.  Intact active dorsiflexion and plantarflexion.  -5 degrees dorsiflexion and 30 degrees plantarflexion. Palpable pedal pulses.  Less than 2-second capillary refill.  Ankle stable ligamentous stress.             Assessment and Plan   Diagnoses and all orders for this visit:    1. Closed avulsion fracture " of left ankle, initial encounter (Primary)  -     diclofenac (VOLTAREN) 50 MG EC tablet; Take 1 tablet by mouth 2 (Two) Times a Day As Needed (pain).  Dispense: 60 tablet; Refill: 0    2. Sprain of left ankle, unspecified ligament, initial encounter  -     XR Ankle 3+ View Left  -     diclofenac (VOLTAREN) 50 MG EC tablet; Take 1 tablet by mouth 2 (Two) Times a Day As Needed (pain).  Dispense: 60 tablet; Refill: 0       Sergo Dominique presents today for follow-up of his left distal fibula avulsion fracture that we have been treating conservatively with initial injury 7/10/2023.  X-rays reviewed with the patient today.  Patient instructed to continue with physical therapy and his home exercises.  We discussed importance of improving ankle range of motion.  Patient expressed understanding and agreed.  Continue with ice and elevation as needed for inflammation.  Work note written today.  Okay to gradually discontinue ankle brace as tolerated.     Patient will follow up in 4 weeks for reevaluation.      Call or return if symptoms worsen or patient has any concerns.        Follow Up   Return in about 4 weeks (around 11/1/2023).  There are no Patient Instructions on file for this visit.  Patient was given instructions and counseling regarding his condition or for health maintenance advice. Please see specific information pulled into the AVS if appropriate.

## 2023-10-04 NOTE — PROGRESS NOTES
Outpatient Physical Therapy                   Physical Therapy Daily Treatment Note    Patient: Sergo Dominique   : 1995  Diagnosis/ICD-10 Code:  Acute left ankle pain [M25.572]  Referring practitioner: Niya Walton PA-C  Date of Initial Visit: Type: THERAPY  Noted: 2023  Today's Date: 10/4/2023  Patient seen for 11 sessions             Subjective   Sergo Dominique reports: after his drive on Friday his while foot and ankle was sore. Typically the only soreness he has is in his achilles. Patient states today his achilles is sore and his pain is 3/10 pain located in the left ankle at time of arrival. Patient states he had a hard time falling to sleep last night because of sharp pains in his achilles and in the lateral anterior aspect of his ankle.     Objective     See Exercise, Manual, and Modality Logs for complete treatment.     Assessment/Plan  Sergo still experiencing increased L ankle pain, especially after driving long hours. Pt tolerated exercises well, with no complaints of increased pain. Pt would benefit from skilled PT to address Range of Motion  and Strength deficits, pain management and any concerns with ADLs.     Progress per Plan of Care      Timed:  Manual Therapy:    10     mins  05997;  Therapeutic Exercise:    8     mins  81835;     Neuromuscular Anish:    0    mins  28695;    Therapeutic Activity:     8     mins  06111;     Gait Trainin     mins  98486;    Aquatic Therapy:     0     mins  32407;       Untimed:  Electrical Stimulation:    0     mins  91645 ( );  Mechanical Traction:    0     mins  15022;       Timed Treatment:   26   mins   Total Treatment:     26   mins      Electronically signed:   Melia eMjía PTA  Physical Therapist Assistant  [facility] License #: [id number]

## 2023-10-09 ENCOUNTER — TREATMENT (OUTPATIENT)
Dept: PHYSICAL THERAPY | Facility: CLINIC | Age: 28
End: 2023-10-09
Payer: COMMERCIAL

## 2023-10-09 DIAGNOSIS — M62.81 MUSCLE WEAKNESS OF LOWER EXTREMITY: ICD-10-CM

## 2023-10-09 DIAGNOSIS — M25.672 ANKLE STIFFNESS, LEFT: ICD-10-CM

## 2023-10-09 DIAGNOSIS — R26.9 GAIT DISTURBANCE: ICD-10-CM

## 2023-10-09 DIAGNOSIS — M25.572 ACUTE LEFT ANKLE PAIN: Primary | ICD-10-CM

## 2023-10-09 PROCEDURE — 97110 THERAPEUTIC EXERCISES: CPT

## 2023-10-09 PROCEDURE — 97530 THERAPEUTIC ACTIVITIES: CPT

## 2023-10-09 NOTE — PROGRESS NOTES
Physical Therapy Daily Treatment Note      Patient: Sergo Dominique   : 1995  Referring practitioner: Niya Walton PA-C  Date of Initial Visit: Type: THERAPY  Noted: 2023  Today's Date: 10/9/2023  Patient seen for 12 sessions           Subjective Questionnaire:       Subjective Evaluation    History of Present Illness    Subjective comment: Pt reports Achillies tenderness today.  Pt reports he saw MD office last week after his pT session and was released to work with restrictions but work will only have him back without restrictions.       Objective   See Exercise, Manual, and Modality Logs for complete treatment.       Assessment & Plan       Assessment  Assessment details: Pt reported SLS on airex is the best it has been requiring only a few fingertip tpas for support.  Pt tolerated strengthening well.  Pt is progressing well and will benefit from continued PT.        Visit Diagnoses:    ICD-10-CM ICD-9-CM   1. Acute left ankle pain  M25.572 719.47   2. Gait disturbance  R26.9 781.2   3. Ankle stiffness, left  M25.672 719.57   4. Muscle weakness of lower extremity  M62.81 728.87       Progress per Plan of Care and Progress strengthening /stabilization /functional activity           Timed:  Manual Therapy:         mins  04282;  Therapeutic Exercise:    23     mins  80045;     Neuromuscular Anish:        mins  77881;    Therapeutic Activity:     8     mins  34758;     Gait Training:           mins  81786;     Ultrasound:          mins  47347;    Electrical Stimulation:         mins  99104 ( );  Aquatic Therapy          mins  41099    Untimed:  Electrical Stimulation:         mins  49525 ( );  Mechanical Traction:         mins  87537;     Timed Treatment:   31   mins   Total Treatment:     31   mins    Electronically signed    Magdalena Kim PTA  Physical Therapist Assistant    [facility] license: [id number]

## 2023-10-11 ENCOUNTER — TREATMENT (OUTPATIENT)
Dept: PHYSICAL THERAPY | Facility: CLINIC | Age: 28
End: 2023-10-11
Payer: COMMERCIAL

## 2023-10-11 DIAGNOSIS — R26.9 GAIT DISTURBANCE: ICD-10-CM

## 2023-10-11 DIAGNOSIS — M62.81 MUSCLE WEAKNESS OF LOWER EXTREMITY: ICD-10-CM

## 2023-10-11 DIAGNOSIS — M25.672 ANKLE STIFFNESS, LEFT: ICD-10-CM

## 2023-10-11 DIAGNOSIS — M25.572 ACUTE LEFT ANKLE PAIN: Primary | ICD-10-CM

## 2023-10-11 NOTE — PROGRESS NOTES
Physical Therapy Daily Treatment Note  Cathie PT: [address] [facility] [place] [address]      Patient: Sergo Dominique   : 1995  Diagnosis/ICD-10 Code:  Acute left ankle pain [M25.572]  Referring practitioner: Niya Walton PA-C  Date of Initial Visit: Type: THERAPY  Noted: 2023  Today's Date: 10/11/2023  Patient seen for 13 sessions           Subjective   The patient reported they will no longer have follow up xrays, as 'everything looked okay'. Pt will continue to follow up with ortho. Pt was instructed to return to work when they have no restrictions.     Objective   See Exercise, Manual, and Modality Logs for complete treatment.     Assessment/Plan  Pt continues to tolerate therapy w/o increased pain. Patient will continue to benefit from skilled physical therapy to further address their deficits in strength and ROM in order to improve upon their functional mobility and to meet their personal goals.          Timed:  Manual Therapy:    8     mins  16311;  Therapeutic Exercise:    15     mins  25642;     Neuromuscular Anish:   0    mins  27417;    Therapeutic Activity:     8     mins  20132;     Gait Trainin     mins  49987;     Aquatics                         0      mins  07651    Un-timed:  Mechanical Traction      0     mins  76575  Electrical Stimulation:    0     mins  54920 ( );      Timed Treatment:   31   mins   Total Treatment:     31   mins    Emeterio De La O PT, DPT    Electronically signed 10/11/2023    [place] License: PT - [id number]

## 2023-10-16 ENCOUNTER — TREATMENT (OUTPATIENT)
Dept: PHYSICAL THERAPY | Facility: CLINIC | Age: 28
End: 2023-10-16
Payer: COMMERCIAL

## 2023-10-16 DIAGNOSIS — M25.572 ACUTE LEFT ANKLE PAIN: Primary | ICD-10-CM

## 2023-10-16 DIAGNOSIS — M25.672 ANKLE STIFFNESS, LEFT: ICD-10-CM

## 2023-10-16 DIAGNOSIS — R26.9 GAIT DISTURBANCE: ICD-10-CM

## 2023-10-16 DIAGNOSIS — M62.81 MUSCLE WEAKNESS OF LOWER EXTREMITY: ICD-10-CM

## 2023-10-16 PROCEDURE — 97530 THERAPEUTIC ACTIVITIES: CPT | Performed by: PHYSICAL THERAPIST

## 2023-10-16 PROCEDURE — 97140 MANUAL THERAPY 1/> REGIONS: CPT | Performed by: PHYSICAL THERAPIST

## 2023-10-16 PROCEDURE — 97110 THERAPEUTIC EXERCISES: CPT | Performed by: PHYSICAL THERAPIST

## 2023-10-16 NOTE — PROGRESS NOTES
Y  Outpatient Physical Therapy                   Physical Therapy Daily Treatment Note    Patient: Sergo Dominique   : 1995  Diagnosis/ICD-10 Code:  Acute left ankle pain [M25.572]  Referring practitioner: Niya Walton PA-C  Date of Initial Visit: Type: THERAPY  Noted: 2023  Today's Date: 10/16/2023  Patient seen for 14 sessions             Subjective   Sergo Dominique reports: he was really sore after last session.     Pain: 1/10 pain, at time of arrival located in his ankle. Patient states his calf has been sore and hurting since last visit; patient rates this 3-4/10 pain.    Objective     See Exercise, Manual, and Modality Logs for complete treatment.     Assessment/Plan  Sergo was challenged during last session as evident by increased soreness. Pt was limited by soreness in his calf and preformed heel raises on the 2 inch step with RAQUEL VELA's today. Pt would benefit from skilled PT to address Range of Motion  and Strength deficits, pain management and any concerns with ADLs.       Progress per Plan of Care      Timed:  Manual Therapy:    8     mins  19603;  Therapeutic Exercise:    16     mins  19183;     Neuromuscular Anish:    0    mins  30699;    Therapeutic Activity:     8    mins  61808;     Gait Trainin     mins  71773;    Aquatic Therapy:     0     mins  60550;       Untimed:  Electrical Stimulation:    0     mins  79008 ( );  Mechanical Traction:    0     mins  61406;       Timed Treatment:   32   mins   Total Treatment:     32   mins      Electronically signed:   Melia Mejía PTA  Physical Therapist Assistant  [facility] License #: [id number]

## 2023-10-18 ENCOUNTER — TREATMENT (OUTPATIENT)
Dept: PHYSICAL THERAPY | Facility: CLINIC | Age: 28
End: 2023-10-18
Payer: COMMERCIAL

## 2023-10-18 DIAGNOSIS — R26.9 GAIT DISTURBANCE: ICD-10-CM

## 2023-10-18 DIAGNOSIS — M62.81 MUSCLE WEAKNESS OF LOWER EXTREMITY: ICD-10-CM

## 2023-10-18 DIAGNOSIS — M25.672 ANKLE STIFFNESS, LEFT: ICD-10-CM

## 2023-10-18 DIAGNOSIS — M25.572 ACUTE LEFT ANKLE PAIN: Primary | ICD-10-CM

## 2023-10-18 NOTE — PROGRESS NOTES
Physical Therapy Daily Treatment Note      Patient: Sergo Dominique   : 1995  Referring practitioner: Niya Walton PA-C  Date of Initial Visit: Type: THERAPY  Noted: 2023  Today's Date: 10/18/2023  Patient seen for 15 sessions           Subjective Questionnaire:       Subjective Evaluation    History of Present Illness    Subjective comment: Pt reports his L ankle feels pretty good.       Objective   See Exercise, Manual, and Modality Logs for complete treatment.       Assessment & Plan       Assessment  Assessment details: Pt did well with balance activities.  Pt tolerated strengthening well   Pt reports his Achillies pain is pretty much resolved and is 1/10 and feels overall he is good.          Visit Diagnoses:    ICD-10-CM ICD-9-CM   1. Acute left ankle pain  M25.572 719.47   2. Gait disturbance  R26.9 781.2   3. Ankle stiffness, left  M25.672 719.57   4. Muscle weakness of lower extremity  M62.81 728.87       Progress per Plan of Care and Progress strengthening /stabilization /functional activity           Timed:  Manual Therapy:         mins  60642;  Therapeutic Exercise:    23     mins  98192;     Neuromuscular Anish:        mins  30415;    Therapeutic Activity:     8     mins  10413;     Gait Training:           mins  90703;     Ultrasound:          mins  59521;    Electrical Stimulation:         mins  29330 ( );  Aquatic Therapy          mins  14244    Untimed:  Electrical Stimulation:         mins  63268 ( );  Mechanical Traction:         mins  69176;     Timed Treatment:   31   mins   Total Treatment:     31   mins    Electronically signed    Magdalena Kim PTA  Physical Therapist Assistant    [facility] license: [id number]

## 2023-10-23 ENCOUNTER — TREATMENT (OUTPATIENT)
Dept: PHYSICAL THERAPY | Facility: CLINIC | Age: 28
End: 2023-10-23
Payer: COMMERCIAL

## 2023-10-23 DIAGNOSIS — R26.9 GAIT DISTURBANCE: ICD-10-CM

## 2023-10-23 DIAGNOSIS — M25.572 ACUTE LEFT ANKLE PAIN: Primary | ICD-10-CM

## 2023-10-23 DIAGNOSIS — M25.672 ANKLE STIFFNESS, LEFT: ICD-10-CM

## 2023-10-23 DIAGNOSIS — M62.81 MUSCLE WEAKNESS OF LOWER EXTREMITY: ICD-10-CM

## 2023-10-23 PROCEDURE — 97530 THERAPEUTIC ACTIVITIES: CPT | Performed by: PHYSICAL THERAPIST

## 2023-10-23 PROCEDURE — 97110 THERAPEUTIC EXERCISES: CPT | Performed by: PHYSICAL THERAPIST

## 2023-10-23 NOTE — PROGRESS NOTES
Outpatient Physical Therapy                   Physical Therapy Daily Treatment Note    Patient: Sergo Dominique   : 1995  Diagnosis/ICD-10 Code:  Acute left ankle pain [M25.572]  Referring practitioner: Niya Walton PA-C  Date of Initial Visit: Type: THERAPY  Noted: 2023  Today's Date: 10/23/2023  Patient seen for 16 sessions             Subjective   Sergo Dominique reports: no new complaints.     Pain: 0/10 pain, at time of arrival.     Objective     See Exercise, Manual, and Modality Logs for complete treatment.     Assessment/Plan  Patient states his calf feels tight from the heel raises but otherwise his ankle feels good. Pt would benefit from skilled PT to address Range of Motion  and Strength deficits, pain management and any concerns with ADLs.     Progress per Plan of Care      Timed:  Manual Therapy:    0     mins  26566;  Therapeutic Exercise:    20     mins  64479;     Neuromuscular Anish:    0    mins  37983;    Therapeutic Activity:     8     mins  81816;     Gait Trainin     mins  13986;    Aquatic Therapy:     0     mins  01317;       Untimed:  Electrical Stimulation:    0     mins  40415 ( );  Mechanical Traction:    0     mins  94298;       Timed Treatment:   28   mins   Total Treatment:     28   mins      Electronically signed:   Melia Mejía PTA  Physical Therapist Assistant  [facility] License #: [id number]

## 2023-10-30 ENCOUNTER — TREATMENT (OUTPATIENT)
Dept: PHYSICAL THERAPY | Facility: CLINIC | Age: 28
End: 2023-10-30
Payer: COMMERCIAL

## 2023-10-30 DIAGNOSIS — M25.672 ANKLE STIFFNESS, LEFT: ICD-10-CM

## 2023-10-30 DIAGNOSIS — M62.81 MUSCLE WEAKNESS OF LOWER EXTREMITY: ICD-10-CM

## 2023-10-30 DIAGNOSIS — M25.572 ACUTE LEFT ANKLE PAIN: Primary | ICD-10-CM

## 2023-10-30 DIAGNOSIS — R26.9 GAIT DISTURBANCE: ICD-10-CM

## 2023-10-30 PROCEDURE — 97110 THERAPEUTIC EXERCISES: CPT

## 2023-10-30 PROCEDURE — 97530 THERAPEUTIC ACTIVITIES: CPT

## 2023-10-30 NOTE — PROGRESS NOTES
Progress Note  [facility]: [address] [place] [address]      Patient: Sergo Dominique   : 1995  Diagnosis/ICD-10 Code:  Acute left ankle pain [M25.572]  Referring practitioner: Niya Walton PA-C  Date of Initial Visit: Type: THERAPY  Noted: 2023  Today's Date: 10/30/2023  Patient seen for 17 sessions      Subjective:   Subjective Questionnaire: LEFS: 54/80  Clinical Progress: improved  Home Program Compliance: Yes  Treatment has included: balance/weight-bearing training, ADL retraining, soft tissue mobilization, strengthening, stretching, therapeutic activities, manual therapy, joint mobilization, home exercise program/patient education, gait training, functional ROM exercises, flexibility, body mechanics training, postural training, and neuromuscular re-education    Subjective   Pt reports they have no pain at this time. Pt reports they do have some time with pain, though it is just noticeable. Pt reports some of the colder weather has made their L ankle and foot stiff.     Objective   Left Ankle/Foot   DF(ke): 3  DF(kf): 4 degree   Plantar flexion: 65 degrees   Inversion: 30 degrees   Eversion: 8 degrees      Strength/Myotome Testing   Left Ankle/Foot   Dorsiflexion: 5  Inversion: 5  Eversion: 4+      See Exercise, Manual, and Modality Logs for complete treatment.     Assessment/Plan  Impairments: abnormal gait, abnormal muscle firing, abnormal or restricted ROM, activity intolerance, impaired balance, impaired physical strength, and pain with function   Functional limitations: carrying objects, lifting, walking, pushing, standing, stooping and unable to perform repetitive tasks      Pt reported to physical therapy s/p avulsion fracture of their L ankle 7/10/2023. Pt continues to show improvement in their ROM as well as their strength. Pt has overall had decreased pain in their L foot and ankle. Encouraged pt to continue performing ROM exs as their HEP. Pt has improvement in  their perceived deficits described by LEFS. Pt's goals will be addressed at this time to reflect their current progress in therapy. Patient will continue to benefit from skilled physical therapy to further address their deficits in strength and ROM in order to improve upon their functional mobility and to meet their personal goals.         Goals  Plan Goals: ANKLE/FOOT PROBLEMS:      1. The patient has limited ROM of the L ankle.              LTG 1: 12 weeks:  The patient will demonstrate 15 degrees of DF with knee extended for the L ankle in order to allow patient to ambulate w/ improved gait mechanics and decreased pain.                          STATUS:  progressing               STG 1a: 6 weeks:  The patient will demonstrate 8 degrees of DF with knee extended for the L ankle.                          STATUS:  progressing               TREATMENT: Manual therapy, therapeutic exercise, home exercise instruction, and modalities as needed to include:  moist heat, electrical stimulation, and ice.     2. The patient has limited strength of the L ankle.              LTG 2: 12 weeks: The patient will demonstrate 5/5 strength for L ankle DF and Eversion to  Increased the overall joint stability of the ankle and foot.                           STATUS:  progressing               STG 2a: 6 weeks: The patient will demonstrate 4/5 strength for L ankle DF and eversion without pain in arch or foot.                          STATUS:  met              TREATMENT: Manual therapy, therapeutic exercise, home exercise instruction, aquatic therapy, and modalities as needed to include:  moist heat, electrical stimulation, ultrasound, and ice.                3. The patient has gait dysfunction.              LTG 3: 12 weeks:  The patient will ambulate independently for community distances with minimal limp to the L lower extremity in order to improve mobility and allow patient to perform activities such as ADLs and work tasks with greater  ease.                          STATUS: met              STG 3a: 6 weeks: Patient will ambulate while wearing their ankle brace for household distances with minimal limp to their L LE.                          STATUS:  met              TREATMENT: Gait training, aquatic therapy, therapeutic exercise, and home exercise instruction.     4. Mobility: Walking/Moving Around Functional Limitation                               LTG 4: 12 weeks:  The patient will demonstrate 12.5% limitation by achieving a score of 70/80 on the LEFS.                          STATUS:  progressing               STG 4 a: 6 weeks:  The patient will demonstrate 25% limitation by achieving a score of 60/80 on the LEFS.                            STATUS:  progressing               TREATMENT:  Manual therapy, therapeutic exercise, home exercise instruction, and modalities as needed to include: moist heat, electrical stimulation, and ultrasound.      Progress toward previous goals: Partially Met      Recommendations: Continue as planned  Timeframe: 2 a week, for 1 months   Prognosis to achieve goals: good    PT Signature: Emeterio De La O PT, DPT    Electronically signed 10/30/2023    [place] License: PT - [id number]       Timed:  Manual Therapy:    0     mins  60110;  Therapeutic Exercise:    9     mins  45226;     Neuromuscular Anish:    0    mins  82812;    Therapeutic Activity:     0     mins  39838;     Gait Trainin     mins  88636;     Aquatics                         0      mins  91617    Un-timed:  Mechanical Traction      0     mins  91851  Dry Needling     0     mins self-pay  Electrical Stimulation:    0     mins  33210 ( );    Timed Treatment:   9   mins   Total Treatment:     9   mins

## 2023-10-30 NOTE — PROGRESS NOTES
Outpatient Physical Therapy                   Physical Therapy Daily Treatment Note    Patient: Sergo Dominique   : 1995  Diagnosis/ICD-10 Code:  Acute left ankle pain [M25.572]  Referring practitioner: Niya Walton PA-C  Date of Initial Visit: Type: THERAPY  Noted: 2023  Today's Date: 10/30/2023  Patient seen for 17 sessions             Subjective   Sergo Dominique reports: 0-1/10 pain, at time of arrival located in his left ankle.     Objective     See Exercise, Manual, and Modality Logs for complete treatment.     Assessment/Plan  Patient initiated exercises with good tolerance. Patient states he can feel a difference with total gym squats and walking lunges. Form with lunges has improved but patient continues to have more anterior weight shift with     Progress per Plan of Care      Timed:  Manual Therapy:    0     mins  46490;  Therapeutic Exercise:    23     mins  37704;     Neuromuscular Anish:    0    mins  18125;    Therapeutic Activity:     8     mins  58101;     Gait Trainin     mins  78739;    Aquatic Therapy:     0     mins  56721;       Untimed:  Electrical Stimulation:    0     mins  61962 ( );  Mechanical Traction:    0     mins  70866;       Timed Treatment:   31   mins   Total Treatment:     31   mins      Electronically signed:   Melia Mejía PTA  Physical Therapist Assistant  [facility] License #: [id number]

## 2023-10-31 NOTE — PROGRESS NOTES
"Chief Complaint  Follow-up of the Left Ankle    Subjective          Sergo Dominique presents to [facility]   History of Present Illness    Sergo Dominique presents today for a follow-up of his left ankle. Patient has a left distal fibula fracture with initial injury 7/10/2023 that we have been treating conservatively. Today, patient states that he is doing well.  He states that his stiffness and swelling have both improved.  He has not been wearing his ankle brace for the last week and states that this has provided him with some relief.  He is continue with physical therapy.  He reports good ankle range of motion and strength.  He does feel that his ankle is stable.      No Known Allergies     Social History     Socioeconomic History    Marital status:    Tobacco Use    Smoking status: Never    Smokeless tobacco: Current     Types: Chew   Vaping Use    Vaping Use: Never used   Substance and Sexual Activity    Alcohol use: Yes    Drug use: Never    Sexual activity: Yes     Partners: Female        I reviewed the patient's chief complaint, history of present illness, review of systems, past medical history, surgical history, family history, social history, medications, and allergy list.     REVIEW OF SYSTEMS    Constitutional: Denies fevers, chills, weight loss  Cardiovascular: Denies chest pain, shortness of breath  Skin: Denies rashes, acute skin changes  Neurologic: Denies headache, loss of consciousness  MSK: Left ankle pain      Objective   Vital Signs:   /81   Pulse 93   Ht 177.8 cm (70\")   Wt 111 kg (245 lb)   SpO2 96%   BMI 35.15 kg/m²     Body mass index is 35.15 kg/m².    Physical Exam    General: Alert. No acute distress.   Left lower extremity: Nontender to medial lateral malleolus.  No swelling.  Ankle stable ligamentous stress.  Demonstrates active ankle plantarflexion and dorsiflexion.  Dorsiflexion 5.  Plantarflexion 40.  Calf soft, nontender.  " Achilles intact.  No pain with inversion or eversion testing.  Sensation intact over dorsal and plantar foot.  Palpable pedal pulses.    Procedures    Imaging Results (Most Recent)       None                   Assessment and Plan    Diagnoses and all orders for this visit:    1. Closed avulsion fracture of left ankle, initial encounter (Primary)    2. Sprain of left ankle, unspecified ligament, initial encounter        Sergo Dominique presents today for a follow up of his left distal fibula fracture that we have been treating conservatively with initial injury 7/10/2023.  Patient directed to continue with formal physical therapy as well as home exercises.  We discussed swelling management with ice and elevation as needed.  Continue to progress with activities as tolerated.  Work note written today.      Patient will follow up as needed.    Call or return if symptoms worsen or patient has any concerns.       Follow Up   Return if symptoms worsen or fail to improve.  Patient was given instructions and counseling regarding his condition or for health maintenance advice. Please see specific information pulled into the AVS if appropriate.     Niya Walton PA-C  11/01/23  16:27 EDT

## 2023-11-01 ENCOUNTER — TREATMENT (OUTPATIENT)
Dept: PHYSICAL THERAPY | Facility: CLINIC | Age: 28
End: 2023-11-01
Payer: COMMERCIAL

## 2023-11-01 ENCOUNTER — OFFICE VISIT (OUTPATIENT)
Dept: ORTHOPEDIC SURGERY | Facility: CLINIC | Age: 28
End: 2023-11-01
Payer: COMMERCIAL

## 2023-11-01 VITALS
HEIGHT: 70 IN | OXYGEN SATURATION: 96 % | HEART RATE: 93 BPM | SYSTOLIC BLOOD PRESSURE: 129 MMHG | DIASTOLIC BLOOD PRESSURE: 81 MMHG | WEIGHT: 245 LBS | BODY MASS INDEX: 35.07 KG/M2

## 2023-11-01 DIAGNOSIS — R26.9 GAIT DISTURBANCE: ICD-10-CM

## 2023-11-01 DIAGNOSIS — M25.572 ACUTE LEFT ANKLE PAIN: Primary | ICD-10-CM

## 2023-11-01 DIAGNOSIS — S93.402A SPRAIN OF LEFT ANKLE, UNSPECIFIED LIGAMENT, INITIAL ENCOUNTER: ICD-10-CM

## 2023-11-01 DIAGNOSIS — M25.672 ANKLE STIFFNESS, LEFT: ICD-10-CM

## 2023-11-01 DIAGNOSIS — S82.892A CLOSED AVULSION FRACTURE OF LEFT ANKLE, INITIAL ENCOUNTER: Primary | ICD-10-CM

## 2023-11-01 NOTE — PROGRESS NOTES
Physical Therapy Daily Treatment Note      Patient: Sergo Dominique   : 1995  Referring practitioner: Niya Walton PA-C  Date of Initial Visit: Type: THERAPY  Noted: 2023  Today's Date: 2023  Patient seen for 18 sessions           Subjective Questionnaire:       Subjective Evaluation    History of Present Illness    Subjective comment: Pt reports his L ankle is feeling good  today and has no pain.  Pt reports he is to see orthopedic today and may be released back to work.       Objective   See Exercise, Manual, and Modality Logs for complete treatment.       Assessment & Plan       Assessment  Assessment details: Pt tolerated strengthening well without report of increased pain or discomfort.  Pt is able to perform single L leg heel raises on 2 inch step.  Pt ambulates with slight gait deviation and no longer wearing a brace.  Pt will benefit from continued PT.        Visit Diagnoses:    ICD-10-CM ICD-9-CM   1. Acute left ankle pain  M25.572 719.47   2. Gait disturbance  R26.9 781.2   3. Ankle stiffness, left  M25.672 719.57       Progress per Plan of Care and Progress strengthening /stabilization /functional activity           Timed:  Manual Therapy:         mins  08204;  Therapeutic Exercise:    23     mins  93145;     Neuromuscular Anish:        mins  38756;    Therapeutic Activity:     9     mins  77443;     Gait Training:           mins  24866;     Ultrasound:          mins  15257;    Electrical Stimulation:         mins  69825 ( );  Aquatic Therapy          mins  44519    Untimed:  Electrical Stimulation:         mins  47746 ( );  Mechanical Traction:         mins  70938;     Timed Treatment:   32   mins   Total Treatment:     32   mins    Electronically signed    Magdalena Kim PTA  Physical Therapist Assistant    [facility] license: [id number]

## 2024-05-20 ENCOUNTER — DOCUMENTATION (OUTPATIENT)
Dept: PHYSICAL THERAPY | Facility: CLINIC | Age: 29
End: 2024-05-20
Payer: COMMERCIAL

## 2025-02-04 ENCOUNTER — OFFICE VISIT (OUTPATIENT)
Dept: FAMILY MEDICINE CLINIC | Facility: CLINIC | Age: 30
End: 2025-02-04
Payer: COMMERCIAL

## 2025-02-04 VITALS
BODY MASS INDEX: 36.18 KG/M2 | WEIGHT: 252.7 LBS | DIASTOLIC BLOOD PRESSURE: 87 MMHG | TEMPERATURE: 97 F | OXYGEN SATURATION: 98 % | SYSTOLIC BLOOD PRESSURE: 136 MMHG | HEART RATE: 86 BPM | HEIGHT: 70 IN

## 2025-02-04 DIAGNOSIS — E55.9 VITAMIN D DEFICIENCY: ICD-10-CM

## 2025-02-04 DIAGNOSIS — T78.1XXA ALLERGIC REACTION TO ALPHA-GAL: ICD-10-CM

## 2025-02-04 DIAGNOSIS — Z00.00 ANNUAL PHYSICAL EXAM: Primary | ICD-10-CM

## 2025-02-04 DIAGNOSIS — N53.19 ANEJACULATION: ICD-10-CM

## 2025-02-04 DIAGNOSIS — E66.812 CLASS 2 OBESITY WITH BODY MASS INDEX (BMI) OF 36.0 TO 36.9 IN ADULT, UNSPECIFIED OBESITY TYPE, UNSPECIFIED WHETHER SERIOUS COMORBIDITY PRESENT: ICD-10-CM

## 2025-02-04 DIAGNOSIS — F17.229 CHEWING TOBACCO NICOTINE DEPENDENCE WITH NICOTINE-INDUCED DISORDER: ICD-10-CM
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
## 2025-02-04 DIAGNOSIS — R21 RASH AND NONSPECIFIC SKIN ERUPTION: ICD-10-CM

## 2025-02-04 LAB
25(OH)D3 SERPL-MCNC: 22.6 NG/ML (ref 30–100)
ALBUMIN SERPL-MCNC: 4.5 G/DL (ref 3.5–5.2)
ALBUMIN/GLOB SERPL: 1.3 G/DL
ALP SERPL-CCNC: 104 U/L (ref 39–117)
ALT SERPL W P-5'-P-CCNC: 32 U/L (ref 1–41)
ANION GAP SERPL CALCULATED.3IONS-SCNC: 12.7 MMOL/L (ref 5–15)
AST SERPL-CCNC: 28 U/L (ref 1–40)
BASOPHILS # BLD AUTO: 0.03 10*3/MM3 (ref 0–0.2)
BASOPHILS NFR BLD AUTO: 0.5 % (ref 0–1.5)
BILIRUB BLD-MCNC: NEGATIVE MG/DL
BILIRUB SERPL-MCNC: 0.4 MG/DL (ref 0–1.2)
BUN SERPL-MCNC: 14 MG/DL (ref 6–20)
BUN/CREAT SERPL: 14.9 (ref 7–25)
CALCIUM SPEC-SCNC: 10.1 MG/DL (ref 8.6–10.5)
CHLORIDE SERPL-SCNC: 102 MMOL/L (ref 98–107)
CHOLEST SERPL-MCNC: 193 MG/DL (ref 0–200)
CLARITY, POC: CLEAR
CO2 SERPL-SCNC: 26.3 MMOL/L (ref 22–29)
COLOR UR: YELLOW
CREAT SERPL-MCNC: 0.94 MG/DL (ref 0.76–1.27)
DEPRECATED RDW RBC AUTO: 39.5 FL (ref 37–54)
EGFRCR SERPLBLD CKD-EPI 2021: 111.1 ML/MIN/1.73
EOSINOPHIL # BLD AUTO: 0.1 10*3/MM3 (ref 0–0.4)
EOSINOPHIL NFR BLD AUTO: 1.5 % (ref 0.3–6.2)
ERYTHROCYTE [DISTWIDTH] IN BLOOD BY AUTOMATED COUNT: 12.3 % (ref 12.3–15.4)
EXPIRATION DATE: NORMAL
GLOBULIN UR ELPH-MCNC: 3.4 GM/DL
GLUCOSE SERPL-MCNC: 90 MG/DL (ref 65–99)
GLUCOSE UR STRIP-MCNC: NEGATIVE MG/DL
HBA1C MFR BLD: 5.2 % (ref 4.8–5.6)
HCT VFR BLD AUTO: 45.7 % (ref 37.5–51)
HDLC SERPL-MCNC: 33 MG/DL (ref 40–60)
HGB BLD-MCNC: 15.7 G/DL (ref 13–17.7)
IMM GRANULOCYTES # BLD AUTO: 0.04 10*3/MM3 (ref 0–0.05)
IMM GRANULOCYTES NFR BLD AUTO: 0.6 % (ref 0–0.5)
KETONES UR QL: NEGATIVE
LDLC SERPL CALC-MCNC: 130 MG/DL (ref 0–100)
LDLC/HDLC SERPL: 3.83 {RATIO}
LEUKOCYTE EST, POC: NEGATIVE
LYMPHOCYTES # BLD AUTO: 1.78 10*3/MM3 (ref 0.7–3.1)
LYMPHOCYTES NFR BLD AUTO: 27.2 % (ref 19.6–45.3)
Lab: NORMAL
MCH RBC QN AUTO: 30.2 PG (ref 26.6–33)
MCHC RBC AUTO-ENTMCNC: 34.4 G/DL (ref 31.5–35.7)
MCV RBC AUTO: 87.9 FL (ref 79–97)
MONOCYTES # BLD AUTO: 0.66 10*3/MM3 (ref 0.1–0.9)
MONOCYTES NFR BLD AUTO: 10.1 % (ref 5–12)
NEUTROPHILS NFR BLD AUTO: 3.94 10*3/MM3 (ref 1.7–7)
NEUTROPHILS NFR BLD AUTO: 60.1 % (ref 42.7–76)
NITRITE UR-MCNC: NEGATIVE MG/ML
NRBC BLD AUTO-RTO: 0 /100 WBC (ref 0–0.2)
PH UR: 5.5 [PH] (ref 5–8)
PLATELET # BLD AUTO: 236 10*3/MM3 (ref 140–450)
PMV BLD AUTO: 11.4 FL (ref 6–12)
POTASSIUM SERPL-SCNC: 4.9 MMOL/L (ref 3.5–5.2)
PROT SERPL-MCNC: 7.9 G/DL (ref 6–8.5)
PROT UR STRIP-MCNC: NEGATIVE MG/DL
PSA SERPL-MCNC: 0.66 NG/ML (ref 0–4)
RBC # BLD AUTO: 5.2 10*6/MM3 (ref 4.14–5.8)
RBC # UR STRIP: NEGATIVE /UL
SODIUM SERPL-SCNC: 141 MMOL/L (ref 136–145)
SP GR UR: 1.02 (ref 1–1.03)
TESTOST SERPL-MCNC: 367 NG/DL (ref 249–836)
TRIGL SERPL-MCNC: 168 MG/DL (ref 0–150)
TSH SERPL DL<=0.05 MIU/L-ACNC: 1.06 UIU/ML (ref 0.27–4.2)
UROBILINOGEN UR QL: NORMAL
VLDLC SERPL-MCNC: 30 MG/DL (ref 5–40)
WBC NRBC COR # BLD AUTO: 6.55 10*3/MM3 (ref 3.4–10.8)

## 2025-02-04 PROCEDURE — 80050 GENERAL HEALTH PANEL: CPT | Performed by: NURSE PRACTITIONER

## 2025-02-04 PROCEDURE — 82306 VITAMIN D 25 HYDROXY: CPT | Performed by: NURSE PRACTITIONER

## 2025-02-04 PROCEDURE — 80061 LIPID PANEL: CPT | Performed by: NURSE PRACTITIONER

## 2025-02-04 PROCEDURE — 99395 PREV VISIT EST AGE 18-39: CPT | Performed by: NURSE PRACTITIONER

## 2025-02-04 PROCEDURE — 86008 ALLG SPEC IGE RECOMB EA: CPT | Performed by: NURSE PRACTITIONER

## 2025-02-04 PROCEDURE — 86666 EHRLICHIA ANTIBODY: CPT | Performed by: NURSE PRACTITIONER

## 2025-02-04 PROCEDURE — 84153 ASSAY OF PSA TOTAL: CPT | Performed by: NURSE PRACTITIONER

## 2025-02-04 PROCEDURE — 87798 DETECT AGENT NOS DNA AMP: CPT | Performed by: NURSE PRACTITIONER

## 2025-02-04 PROCEDURE — 81003 URINALYSIS AUTO W/O SCOPE: CPT | Performed by: NURSE PRACTITIONER

## 2025-02-04 PROCEDURE — 86617 LYME DISEASE ANTIBODY: CPT | Performed by: NURSE PRACTITIONER

## 2025-02-04 PROCEDURE — 83036 HEMOGLOBIN GLYCOSYLATED A1C: CPT | Performed by: NURSE PRACTITIONER

## 2025-02-04 PROCEDURE — 99214 OFFICE O/P EST MOD 30 MIN: CPT | Performed by: NURSE PRACTITIONER

## 2025-02-04 PROCEDURE — 84403 ASSAY OF TOTAL TESTOSTERONE: CPT | Performed by: NURSE PRACTITIONER

## 2025-02-04 RX ORDER — CLOTRIMAZOLE AND BETAMETHASONE DIPROPIONATE 10; .64 MG/G; MG/G
1 CREAM TOPICAL 2 TIMES DAILY
Qty: 45 G | Refills: 0 | Status: SHIPPED | OUTPATIENT
Start: 2025-02-04

## 2025-02-04 RX ORDER — LEVOCETIRIZINE DIHYDROCHLORIDE 5 MG/1
5 TABLET, FILM COATED ORAL EVERY EVENING
COMMUNITY

## 2025-02-04 NOTE — PROGRESS NOTES
Chief Complaint  Sexual Problem (Ejaculation with presence of semen ) and Annual Exam    Subjective            Sergo Dominique is a 29 y.o. male who presents to [facility]   History of Present Illness  Annual physical.    He states that 2-3 times when he has had intercourse with his wife, he does not have any semen when he ejaculates.  He states it was normal the last time.  He denies any testicular swelling or pain.    He is also complaining of a rash that has been ongoing for months.  He states the rashes under his armpits, his knees, his waist and his buttocks area.  He states that he goes hunting and he thought maybe he had turkey mites.  He does admit to having some tick bites in the past.  He states the rash is itchy.  He denies any new soaps, detergents or deodorant.  He states he tried changing soaps, detergents and deodorant but it did not help.    He takes over-the-counter omeprazole 20 mg daily as needed for GERD.    He takes Xyzal 5 mg daily for allergies.    Discussed and reviewed   Alcohol Misuse Screening and Counseling, states drinks about once per week, less than 6 beers.   Cardiovascular Disease Screening Tests, fasting lipid panel  Colorectal Cancer Screening, no FH  Counseling to Prevent Tobacco Use Sergo Dominique  reports that he has never smoked. His smokeless tobacco use includes chew. I have educated him on the risk of diseases from using tobacco products such as cancer and heart disease.     I advised him to quit and he is willing to quit. We have discussed the following method/s for tobacco cessation:  Education Material, Counseling, and states he is working on cutting back .       I spent 5 minutes counseling the patient.     Diabetes Screening-Lab Order for either glucose or A1c, he does have FH diabetes.   Glaucoma screening, recommend routine vision exams.  Recommend routine biannual dental exams.  Hepatitis C Virus Screening (beneficiaries must  "fall into one of the following categories to be eligible- high risk for HCV infection, born between 1081-4491, or history of blood transfusion before 1992), not indicated.   Human Immunodeficiency Virus (HIV) Screening, declines.   Screening for Sexually Transmitted Infections (STIs), declines.   Prostate Cancer Screening for males ages 50-75 or sooner if any family history of prostate cancer.  States his Paternal grandfather had prostate cancer.   Screening urinalysis:  Body mass index is 36.26 kg/m².    [facility] IMMUNIZATIONS: Tdap, Influenza, and COVID19 discussed and all declined.       PHQ-2 Depression Screening  Little interest or pleasure in doing things? Not at all   Feeling down, depressed, or hopeless? Not at all   PHQ-2 Total Score 0           Tobacco Use: High Risk (2/4/2025)    Patient History     Smoking Tobacco Use: Never     Smokeless Tobacco Use: Current     Passive Exposure: Not on file      E-cigarette/Vaping    E-cigarette/Vaping Use Never User      E-cigarette/Vaping Substances     E-cigarette/Vaping Devices       Alcohol Use: Not on file         Objective   Vital Signs:   Vitals:    02/04/25 0707   BP: 136/87   BP Location: Left arm   Patient Position: Sitting   Cuff Size: Adult   Pulse: 86   Temp: 97 °F (36.1 °C)   SpO2: 98%   Weight: 115 kg (252 lb 11.2 oz)   Height: 177.8 cm (70\")   PainSc: 0-No pain     Body mass index is 36.26 kg/m².    Wt Readings from Last 3 Encounters:   02/04/25 115 kg (252 lb 11.2 oz)   11/01/23 111 kg (245 lb)   10/04/23 110 kg (243 lb)     BP Readings from Last 3 Encounters:   02/04/25 136/87   11/01/23 129/81   10/04/23 136/85       Health Maintenance   Topic Date Due    INFLUENZA VACCINE  03/31/2025 (Originally 7/1/2024)    COVID-19 Vaccine (1 - 2024-25 season) 02/04/2026 (Originally 9/1/2024)    TDAP/TD VACCINES (1 - Tdap) 02/04/2026 (Originally 7/28/2012)    ANNUAL PHYSICAL  02/04/2026    BMI FOLLOWUP  02/04/2026    Pneumococcal Vaccine 0-64  Aged Out    HEPATITIS " "C SCREENING  Discontinued       /87 (BP Location: Left arm, Patient Position: Sitting, Cuff Size: Adult)   Pulse 86   Temp 97 °F (36.1 °C)   Ht 177.8 cm (70\")   Wt 115 kg (252 lb 11.2 oz)   SpO2 98%   BMI 36.26 kg/m²       Current Outpatient Medications:     levocetirizine (Xyzal Allergy 24HR) 5 MG tablet, Take 1 tablet by mouth Every Evening., Disp: , Rfl:     omeprazole (priLOSEC) 20 MG capsule, Take 1 capsule by mouth Daily., Disp: , Rfl:     clotrimazole-betamethasone (LOTRISONE) 1-0.05 % cream, Apply 1 Application topically to the appropriate area as directed 2 (Two) Times a Day. Indications: rash, Disp: 45 g, Rfl: 0   Past Medical History:   Diagnosis Date    Allergic     Tendinitis of knee 2009/2010        Physical Exam  Vitals reviewed.   Constitutional:       Appearance: Normal appearance. He is well-developed. He is obese.   HENT:      Right Ear: Tympanic membrane, ear canal and external ear normal.      Left Ear: Tympanic membrane, ear canal and external ear normal.      Mouth/Throat:      Mouth: Mucous membranes are moist.      Pharynx: No pharyngeal swelling, oropharyngeal exudate or posterior oropharyngeal erythema.   Neck:      Thyroid: No thyroid mass, thyromegaly or thyroid tenderness.   Cardiovascular:      Rate and Rhythm: Normal rate and regular rhythm.      Heart sounds: No murmur heard.     No friction rub. No gallop.   Pulmonary:      Effort: Pulmonary effort is normal.      Breath sounds: Normal breath sounds. No wheezing or rhonchi.   Lymphadenopathy:      Cervical: No cervical adenopathy.   Skin:     General: Skin is warm and dry.   Neurological:      Mental Status: He is alert and oriented to person, place, and time.      Cranial Nerves: No cranial nerve deficit.   Psychiatric:         Mood and Affect: Mood and affect normal.         Behavior: Behavior normal.         Thought Content: Thought content normal. Thought content does not include homicidal or suicidal ideation.       "   Judgment: Judgment normal.          Result Review :    The following data was reviewed by: ROSA Engel on 02/04/2025:  Lab Results (last 24 hours)       Procedure Component Value Units Date/Time    Vitamin D,25-Hydroxy [[id number]] Collected: 02/04/25 0737    Specimen: Blood from Arm, Right Updated: 02/04/25 0737    TSH Rfx On Abnormal To Free T4 [[id number]] Collected: 02/04/25 0737    Specimen: Blood Updated: 02/04/25 0737    CBC Auto Differential [[id number]] Collected: 02/04/25 0737    Specimen: Blood Updated: 02/04/25 0737    Comprehensive Metabolic Panel [[id number]] Collected: 02/04/25 0737    Specimen: Blood Updated: 02/04/25 0737    Lipid Panel [[id number]] Collected: 02/04/25 0737    Specimen: Blood Updated: 02/04/25 0737    Hemoglobin A1c [[id number]] Collected: 02/04/25 0737    Specimen: Blood Updated: 02/04/25 0737    PSA DIAGNOSTIC [[id number]] Collected: 02/04/25 0737    Specimen: Blood Updated: 02/04/25 0737    Testosterone [[id number]] Collected: 02/04/25 0737    Specimen: Blood Updated: 02/04/25 0737    Ehrlichia Antibody Panel [[id number]] Collected: 02/04/25 0740    Specimen: Blood Updated: 02/04/25 0740    Alpha-Gal, IgE, Serum [[id number]] Collected: 02/04/25 0740    Specimen: Blood from Arm, Right Updated: 02/04/25 0740    Rickettsia Species DNA, Real-Time PCR [[id number]] Collected: 02/04/25 0740    Specimen: Blood Updated: 02/04/25 0740    Lyme Disease, Line Blot [[id number]] Collected: 02/04/25 0740    Specimen: Blood from Arm, Right Updated: 02/04/25 0740    POCT urinalysis dipstick, automated [[id number]]  (Normal) Collected: 02/04/25 0747    Specimen: Urine Updated: 02/04/25 0747     Color Yellow     Clarity, UA Clear     Specific Gravity  1.020     pH, Urine 5.5     Leukocytes Negative     Nitrite, UA Negative     Protein, POC Negative mg/dL      Glucose, UA Negative mg/dL      Ketones, UA Negative     Urobilinogen, UA Normal     Bilirubin Negative     Blood, UA Negative     Lot  Number [id number]     Expiration Date 3/31/25          Assessment & Plan  Annual physical exam  Health maintenance and prevention discussed, handouts provided, see AVS.  Orders:    TSH Rfx On Abnormal To Free T4    CBC Auto Differential    Comprehensive Metabolic Panel    Lipid Panel    POCT urinalysis dipstick, automated    Anejaculation  I will check some labs today  Orders:    Hemoglobin A1c    PSA DIAGNOSTIC    Testosterone    POCT urinalysis dipstick, automated    Class 2 obesity with body mass index (BMI) of 36.0 to 36.9 in adult, unspecified obesity type, unspecified whether serious comorbidity present  Patient's (Body mass index is 36.26 kg/m².) indicates that they are obese (BMI >30) with health conditions that include none and GERD . Weight is newly identified. BMI  is above average; BMI management plan is completed. We discussed portion control, increasing exercise, and Information on healthy weight added to patient's after visit summary.     Orders:    Vitamin D,25-Hydroxy    Hemoglobin A1c    Rash and nonspecific skin eruption  I will check tick titers today.  I will prescribe him Lotrisone cream to apply twice daily, advised to use for at least 2 weeks.  Advised to let me know if it is not improving and we will consider a prednisone Dosepak.  Orders:    Ehrlichia Antibody Panel    Alpha-Gal, IgE, Serum    Rickettsia Species DNA, Real-Time PCR    Lyme Disease, Line Blot    clotrimazole-betamethasone (LOTRISONE) 1-0.05 % cream; Apply 1 Application topically to the appropriate area as directed 2 (Two) Times a Day. Indications: rash    Tick bite, unspecified site, initial encounter  Checking tick titers today.  Orders:    Ehrlichia Antibody Panel    Alpha-Gal, IgE, Serum    Rickettsia Species DNA, Real-Time PCR    Lyme Disease, Line Blot    Chewing tobacco nicotine dependence with nicotine-induced disorder  Tobacco use is newly identified.  We discussed smokeless tobacco health risk, recommend quitting.   Patient is slowly decreasing the amount every day.  Tobacco use will be reassessed at the next regular appointment.       Diagnosis Plan   1. Annual physical exam  TSH Rfx On Abnormal To Free T4    CBC Auto Differential    Comprehensive Metabolic Panel    Lipid Panel    POCT urinalysis dipstick, automated      2. Anejaculation  Hemoglobin A1c    PSA DIAGNOSTIC    Testosterone    POCT urinalysis dipstick, automated      3. Class 2 obesity with body mass index (BMI) of 36.0 to 36.9 in adult, unspecified obesity type, unspecified whether serious comorbidity present  Vitamin D,25-Hydroxy    Hemoglobin A1c      4. Rash and nonspecific skin eruption  Ehrlichia Antibody Panel    Alpha-Gal, IgE, Serum    Rickettsia Species DNA, Real-Time PCR    Lyme Disease, Line Blot    clotrimazole-betamethasone (LOTRISONE) 1-0.05 % cream      5. Tick bite, unspecified site, initial encounter  Ehrlichia Antibody Panel    Alpha-Gal, IgE, Serum    Rickettsia Species DNA, Real-Time PCR    Lyme Disease, Line Blot      6. Chewing tobacco nicotine dependence with nicotine-induced disorder              FOLLOW UP  Return will determine follow up after reviewing lab results.  Patient was given instructions and counseling regarding his condition or for health maintenance advice. Please see specific information pulled into the AVS if appropriate.       CURRENT & DISCONTINUED MEDICATIONS  Current Outpatient Medications   Medication Instructions    clotrimazole-betamethasone (LOTRISONE) 1-0.05 % cream 1 Application, Topical, 2 Times Daily    levocetirizine (XYZAL ALLERGY 24HR) 5 mg, Every Evening    omeprazole (PRILOSEC) 20 mg, Daily       Medications Discontinued During This Encounter   Medication Reason    diclofenac (VOLTAREN) 50 MG EC tablet *Therapy completed    fexofenadine (ALLEGRA) 180 MG tablet *Therapy completed        Parts of this note are electronic transcriptions/translations of spoken language to printed text using the Dragon Dictation  system.    Olya Ramos, APRN  02/04/25  07:49 EST

## 2025-02-04 NOTE — ASSESSMENT & PLAN NOTE
I will check some labs today  Orders:    Hemoglobin A1c    PSA DIAGNOSTIC    Testosterone    POCT urinalysis dipstick, automated

## 2025-02-04 NOTE — ASSESSMENT & PLAN NOTE
Tobacco use is newly identified.  We discussed smokeless tobacco health risk, recommend quitting.  Patient is slowly decreasing the amount every day.  Tobacco use will be reassessed at the next regular appointment.

## 2025-02-04 NOTE — PATIENT INSTRUCTIONS
Exercising to Lose Weight  Getting regular exercise is important for everyone. It is especially important if you are overweight. Being overweight increases your risk of heart disease, stroke, diabetes, high blood pressure, and several types of cancer. Exercising, and reducing the calories you consume, can help you lose weight and improve fitness and health.  Exercise can be moderate or vigorous intensity. To lose weight, most people need to do a certain amount of moderate or vigorous-intensity exercise each week.  How can exercise affect me?  You lose weight when you exercise enough to burn more calories than you eat. Exercise also reduces body fat and builds muscle. The more muscle you have, the more calories you burn. Exercise also:  Improves mood.  Reduces stress and tension.  Improves your overall fitness, flexibility, and endurance.  Increases bone strength.  Moderate-intensity exercise    Moderate-intensity exercise is any activity that gets you moving enough to burn at least three times more energy (calories) than if you were sitting.  Examples of moderate exercise include:  Walking a mile in 15 minutes.  Doing light yard work.  Biking at an easy pace.  Most people should get at least 150 minutes of moderate-intensity exercise a week to maintain their body weight.  Vigorous-intensity exercise  Vigorous-intensity exercise is any activity that gets you moving enough to burn at least six times more calories than if you were sitting. When you exercise at this intensity, you should be working hard enough that you are not able to carry on a conversation.  Examples of vigorous exercise include:  Running.  Playing a team sport, such as football, basketball, and soccer.  Jumping rope.  Most people should get at least 75 minutes a week of vigorous exercise to maintain their body weight.  What actions can I take to lose weight?  The amount of exercise you need to lose weight depends on:  Your age.  The type of  exercise.  Any health conditions you have.  Your overall physical ability.  Talk to your health care provider about how much exercise you need and what types of activities are safe for you.  Nutrition    Make changes to your diet as told by your health care provider or diet and nutrition specialist (dietitian). This may include:  Eating fewer calories.  Eating more protein.  Eating less unhealthy fats.  Eating a diet that includes fresh fruits and vegetables, whole grains, low-fat dairy products, and lean protein.  Avoiding foods with added fat, salt, and sugar.  Drink plenty of water while you exercise to prevent dehydration or heat stroke.  Activity  Choose an activity that you enjoy and set realistic goals. Your health care provider can help you make an exercise plan that works for you.  Exercise at a moderate or vigorous intensity most days of the week.  The intensity of exercise may vary from person to person. You can tell how intense a workout is for you by paying attention to your breathing and heartbeat. Most people will notice their breathing and heartbeat get faster with more intense exercise.  Do resistance training twice each week, such as:  Push-ups.  Sit-ups.  Lifting weights.  Using resistance bands.  Getting short amounts of exercise can be just as helpful as long, structured periods of exercise. If you have trouble finding time to exercise, try doing these things as part of your daily routine:  Get up, stretch, and walk around every 30 minutes throughout the day.  Go for a walk during your lunch break.  Park your car farther away from your destination.  If you take public transportation, get off one stop early and walk the rest of the way.  Make phone calls while standing up and walking around.  Take the stairs instead of elevators or escalators.  Wear comfortable clothes and shoes with good support.  Do not exercise so much that you hurt yourself, feel dizzy, or get very short of breath.  Where to  find more information  [place] Department of Health and Human Services: www.hhs.gov  Centers for Disease Control and Prevention: www.cdc.gov  Contact a health care provider:  Before starting a new exercise program.  If you have questions or concerns about your weight.  If you have a medical problem that keeps you from exercising.  Get help right away if:  You have any of the following while exercising:  Injury.  Dizziness.  Difficulty breathing or shortness of breath that does not go away when you stop exercising.  Chest pain.  Rapid heartbeat.  These symptoms may represent a serious problem that is an emergency. Do not wait to see if the symptoms will go away. Get medical help right away. Call your local emergency services (911 in the [place] Do not drive yourself to the hospital.  Summary  Getting regular exercise is especially important if you are overweight.  Being overweight increases your risk of heart disease, stroke, diabetes, high blood pressure, and several types of cancer.  Losing weight happens when you burn more calories than you eat.  Reducing the amount of calories you eat, and getting regular moderate or vigorous exercise each week, helps you lose weight.  This information is not intended to replace advice given to you by your health care provider. Make sure you discuss any questions you have with your health care provider.  Document Revised: 02/13/2022 Document Reviewed: 02/13/2022  [employer] Patient Education © 2024 [employer]  MyPlate from [employer]    MyPlate is an outline of a general healthy diet based on the Dietary Guidelines for Americans, 9527-9314, from the [place] Department of Agriculture (USDA). It sets guidelines for how much food you should eat from each food group based on your age, sex, and level of physical activity.  What are tips for following MyPlate?  To follow MyPlate recommendations:  Eat a wide variety of fruits and vegetables, grains, and protein foods.  Serve smaller portions and  eat less food throughout the day.  Limit portion sizes to avoid overeating.  Enjoy your food.  Get at least 150 minutes of exercise every week. This is about 30 minutes each day, 5 or more days per week.  It can be difficult to have every meal look like MyPlate. Think about MyPlate as eating guidelines for an entire day, rather than each individual meal.  Fruits and vegetables  Make one half of your plate fruits and vegetables.  Eat many different colors of fruits and vegetables each day.  For a 2,000-calorie daily food plan, eat:  2½ cups of vegetables every day.  2 cups of fruit every day.  1 cup is equal to:  1 cup raw or cooked vegetables.  1 cup raw fruit.  1 medium-sized orange, apple, or banana.  1 cup 100% fruit or vegetable juice.  2 cups raw leafy greens, such as lettuce, spinach, or kale.  ½ cup dried fruit.  Grains  One fourth of your plate should be grains.  Make at least half of the grains you eat each day whole grains.  For a 2,000-calorie daily food plan, eat 6 oz of grains every day.  1 oz is equal to:  1 slice bread.  1 cup cereal.  ½ cup cooked rice, cereal, or pasta.  Protein  One fourth of your plate should be protein.  Eat a wide variety of protein foods, including meat, poultry, fish, eggs, beans, nuts, and tofu.  For a 2,000-calorie daily food plan, eat 5½ oz of protein every day.  1 oz is equal to:  1 oz meat, poultry, or fish.  ¼ cup cooked beans.  1 egg.  ½ oz nuts or seeds.  1 Tbsp peanut butter.  Dairy  Drink fat-free or low-fat (1%) milk.  Eat or drink dairy as a side to meals.  For a 2,000-calorie daily food plan, eat or drink 3 cups of dairy every day.  1 cup is equal to:  1 cup milk, yogurt, cottage cheese, or soy milk (soy beverage).  2 oz processed cheese.  1½ oz natural cheese.  Fats, oils, salt, and sugars  Only small amounts of oils are recommended.  Avoid foods that are high in calories and low in nutritional value (empty calories), like foods high in fat or added  sugars.  Choose foods that are low in salt (sodium). Choose foods that have less than 140 milligrams (mg) of sodium per serving.  Drink water instead of sugary drinks. Drink enough fluid to keep your urine pale yellow.  Where to find support  Work with your health care provider or a dietitian to develop a customized eating plan that is right for you.  Download an rbidget (mobile application) to help you track your daily food intake.  Where to find more information  USDA: ChooseMyPlate.gov  Summary  MyPlate is a general guideline for healthy eating from the USDA. It is based on the Dietary Guidelines for Americans, 9897-5475.  In general, fruits and vegetables should take up one half of your plate, grains should take up one fourth of your plate, and protein should take up one fourth of your plate.  This information is not intended to replace advice given to you by your health care provider. Make sure you discuss any questions you have with your health care provider.  Document Revised: 11/08/2021 Document Reviewed: 11/08/2021  [employer] Patient Education © 2024 [employer]

## 2025-02-04 NOTE — ASSESSMENT & PLAN NOTE
Patient's (Body mass index is 36.26 kg/m².) indicates that they are obese (BMI >30) with health conditions that include none and GERD . Weight is newly identified. BMI  is above average; BMI management plan is completed. We discussed portion control, increasing exercise, and Information on healthy weight added to patient's after visit summary.     Orders:    Vitamin D,25-Hydroxy    Hemoglobin A1c

## 2025-02-06 LAB
A PHAGOCYTOPH IGG TITR SER IF: NEGATIVE {TITER}
A PHAGOCYTOPH IGM TITR SER IF: NEGATIVE {TITER}
E CHAFFEENSIS IGG TITR SER IF: NEGATIVE {TITER}
E CHAFFEENSIS IGM TITR SER IF: NEGATIVE {TITER}
RESULT COMMENT:: NORMAL

## 2025-02-08 LAB — RICKETTSIA RICKETTSII DNA, RT: NOT DETECTED

## 2025-02-09 LAB — ALPHA-GAL IGE QN: 3.09 KU/L

## 2025-02-11 LAB
B BURGDOR IGG PATRN SER IB-IMP: NEGATIVE
B BURGDOR IGM PATRN SER IB-IMP: NEGATIVE
B BURGDOR18KD IGG SER QL IB: NORMAL
B BURGDOR23KD IGG SER QL IB: NORMAL
B BURGDOR23KD IGM SER QL IB: NORMAL
B BURGDOR28KD IGG SER QL IB: NORMAL
B BURGDOR30KD IGG SER QL IB: NORMAL
B BURGDOR39KD IGG SER QL IB: NORMAL
B BURGDOR39KD IGM SER QL IB: NORMAL
B BURGDOR41KD IGG SER QL IB: NORMAL
B BURGDOR41KD IGM SER QL IB: NORMAL
B BURGDOR45KD IGG SER QL IB: NORMAL
B BURGDOR58KD IGG SER QL IB: NORMAL
B BURGDOR66KD IGG SER QL IB: NORMAL
B BURGDOR93KD IGG SER QL IB: NORMAL

## 2025-03-26 ENCOUNTER — OFFICE VISIT (OUTPATIENT)
Dept: FAMILY MEDICINE CLINIC | Facility: CLINIC | Age: 30
End: 2025-03-26
Payer: COMMERCIAL

## 2025-03-26 VITALS
HEART RATE: 110 BPM | DIASTOLIC BLOOD PRESSURE: 91 MMHG | TEMPERATURE: 96.8 F | WEIGHT: 248.6 LBS | SYSTOLIC BLOOD PRESSURE: 135 MMHG | HEIGHT: 70 IN | OXYGEN SATURATION: 100 % | RESPIRATION RATE: 16 BRPM | BODY MASS INDEX: 35.59 KG/M2

## 2025-03-26 DIAGNOSIS — R09.81 SINUS CONGESTION: ICD-10-CM

## 2025-03-26 DIAGNOSIS — R05.1 ACUTE COUGH: Primary | ICD-10-CM

## 2025-03-26 DIAGNOSIS — T78.1XXA ALLERGIC REACTION TO ALPHA-GAL: ICD-10-CM

## 2025-03-26 RX ORDER — BROMPHENIRAMINE MALEATE, PSEUDOEPHEDRINE HYDROCHLORIDE, AND DEXTROMETHORPHAN HYDROBROMIDE 2; 30; 10 MG/5ML; MG/5ML; MG/5ML
5 SYRUP ORAL 4 TIMES DAILY PRN
Qty: 240 ML | Refills: 0 | Status: SHIPPED | OUTPATIENT
Start: 2025-03-26

## 2025-03-26 RX ORDER — METHYLPREDNISOLONE 4 MG/1
TABLET ORAL
Qty: 21 TABLET | Refills: 0 | Status: SHIPPED | OUTPATIENT
Start: 2025-03-26

## 2025-03-26 NOTE — PROGRESS NOTES
Chief Complaint  Earache and Rash (Hands and head- states he was RX amox from urgent care [place] and developed rash)    Subjective          Sergo Dominique presents to [facility]  Earache   Associated symptoms include a rash. Pertinent negatives include no coughing, diarrhea or vomiting.   Rash  Pertinent negatives include no cough, diarrhea, fatigue, shortness of breath or vomiting.       History of Present Illness  The patient presents for evaluation of influenza, left ear infection, and alpha-gal allergy.    He sought medical attention at [facility] in [place] on 03/19/2025, where he was diagnosed with an upper respiratory viral infection. A subsequent visit on 03/20/2025 revealed a fever of 103 degrees, prompting another swab test that confirmed influenza. Despite the offer of Tamiflu, he declined the medication. He was prescribed Tessalon Perles for his cough but did not take it as he prefers liquid medication. He has not received his influenza vaccine. He was also given Claritin-D by [facility]. 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 Upon waking up on Monday, he found his eye sealed shut and was prescribed antibiotic drops.    During a follow-up visit, an ear infection was detected in his left ear, and he was prescribed amoxicillin 1000 mg 3 times daily. After 4 or 5 doses, he noticed spots on his hands and head, a new symptom even prior to his alpha-gal diagnosis. He also reported spots on his elbows but not on his abdomen. He took 2 doses of amoxicillin on Monday and 1 dose yesterday. He has previously received penicillin injections for strep throat.    He has a history of alpha-gal allergy and has discontinued red meat consumption. He has not used clotrimazole betamethasone cream since his symptoms resolved but has noticed new spots on his head and knuckles. He  "shaves his head regularly but refrained from doing so while ill.    ALLERGIES  The patient has an allergy to ALPHA-GAL.    MEDICATIONS  Current: clotrimazole/betamethasone cream  Discontinued: amoxicillin    IMMUNIZATIONS  He has not received his influenza vaccine.      Patient or patient representative verbalized consent for the use of Ambient Listening during the visit with  ROSA Rayo for chart documentation. 3/30/2025  15:13 EDT                     Allergies  Patient has no known allergies.    Social History     Tobacco Use    Smoking status: Never    Smokeless tobacco: Current     Types: Chew    Tobacco comments:     Dip   Vaping Use    Vaping status: Never Used   Substance Use Topics    Alcohol use: Yes    Drug use: Never       Family History   Problem Relation Age of Onset    Heart disease Paternal Grandfather         There are no preventive care reminders to display for this patient.     Immunization History   Administered Date(s) Administered    DTaP, Unspecified 07/29/1997    Hep A, 2 Dose 02/06/2007    Hepatitis A 05/22/2018    MCV4 Unspecified 02/06/2007    MMR 07/29/1997    OPV 07/29/1997       Review of Systems   Constitutional:  Negative for fatigue.   HENT:  Positive for ear pain.    Respiratory:  Negative for cough and shortness of breath.    Cardiovascular:  Negative for chest pain.   Gastrointestinal:  Negative for diarrhea, nausea and vomiting.   Skin:  Positive for rash.        Objective       Vitals:    03/26/25 1459   BP: 135/91   Pulse: 110   Resp: 16   Temp: 96.8 °F (36 °C)   SpO2: 100%   Weight: 113 kg (248 lb 9.6 oz)   Height: 177.8 cm (70\")       Body mass index is 35.67 kg/m².         Physical Exam  Vitals reviewed.   Constitutional:       Appearance: Normal appearance. He is well-developed.   HENT:      Right Ear: Tympanic membrane and ear canal normal. There is no impacted cerumen.      Left Ear: Tympanic membrane and ear canal normal. There is no impacted cerumen.      " Nose: Congestion and rhinorrhea present.      Mouth/Throat:      Pharynx: Posterior oropharyngeal erythema present. No oropharyngeal exudate.   Eyes:      General: No scleral icterus.        Right eye: No discharge.         Left eye: No discharge.      Conjunctiva/sclera: Conjunctivae normal.   Cardiovascular:      Rate and Rhythm: Normal rate and regular rhythm.      Heart sounds: Normal heart sounds. No murmur heard.  Pulmonary:      Effort: Pulmonary effort is normal.      Breath sounds: Normal breath sounds.   Neurological:      Mental Status: He is alert and oriented to person, place, and time.      Cranial Nerves: No cranial nerve deficit.      Motor: No weakness.   Psychiatric:         Mood and Affect: Mood and affect normal.           Physical Exam  There is significant drainage in the nasopharynx. The left ear shows clear fluid and pressure behind the eardrum.  There is mild congestion in the chest, but the bases sound good.              Result Review :     The following data was reviewed by: RSOA Rayo on 03/26/2025:            No Images in the past 120 days found..         Results  Laboratory Studies  Swab test confirmed influenza.                    Assessment and Plan      Assessment & Plan  Acute cough    Orders:    methylPREDNISolone (MEDROL) 4 MG dose pack; Take as directed on package instructions.    brompheniramine-pseudoephedrine-DM 30-2-10 MG/5ML syrup; Take 5 mL by mouth 4 (Four) Times a Day As Needed for Congestion or Cough.    Chlorcyclizine-Pseudoephed 25-60 MG tablet; Take 1 tablet by mouth Every 12 (Twelve) Hours As Needed (allergies/congestion).    Sinus congestion    Orders:    methylPREDNISolone (MEDROL) 4 MG dose pack; Take as directed on package instructions.    brompheniramine-pseudoephedrine-DM 30-2-10 MG/5ML syrup; Take 5 mL by mouth 4 (Four) Times a Day As Needed for Congestion or Cough.    Chlorcyclizine-Pseudoephed 25-60 MG tablet; Take 1 tablet by mouth Every 12  (Twelve) Hours As Needed (allergies/congestion).    Allergic reaction to alpha-gal            Diagnosis Plan   1. Acute cough  methylPREDNISolone (MEDROL) 4 MG dose pack    brompheniramine-pseudoephedrine-DM 30-2-10 MG/5ML syrup    Chlorcyclizine-Pseudoephed 25-60 MG tablet      2. Sinus congestion  methylPREDNISolone (MEDROL) 4 MG dose pack    brompheniramine-pseudoephedrine-DM 30-2-10 MG/5ML syrup    Chlorcyclizine-Pseudoephed 25-60 MG tablet      3. Allergic reaction to alpha-gal              Assessment & Plan  1. Influenza.  He was diagnosed with influenza at urgent care and was offered Tamiflu but declined it. He will be started on a Medrol Dosepak for 7 days, with instructions to take 1 tablet tonight and then follow the tapering schedule. Bromfed cough syrup will be prescribed to help with symptoms. Stay-Hist tablets will be prescribed, to be taken twice daily as needed. He is advised to discontinue Claritin-D. Prescriptions will be sent to [facility].    2. Left ear infection.  He was previously diagnosed with a left ear infection and prescribed amoxicillin, which he discontinued after developing a rash. Upon examination, there is no current infection, but there is pressure and clear fluid behind the eardrum. A nasal spray will be prescribed to help alleviate the pressure.    3. Alpha-gal allergy.  He developed a rash on his hands and head after taking amoxicillin, which he attributes to his known alpha-gal allergy. He is advised to use clotrimazole betamethasone cream sparingly on the affected areas for symptomatic relief.          Follow Up     Return if symptoms worsen or fail to improve.    Patient was given instructions and counseling regarding his condition or for health maintenance advice. Please see specific information pulled into the AVS if appropriate.     Parts of this note are electronic transcriptions/translations of spoken language to printed text using the Dragon Dictation  system.          Tita Mtz, ROSA  03/26/2025